# Patient Record
Sex: MALE | Race: BLACK OR AFRICAN AMERICAN | Employment: UNEMPLOYED | URBAN - METROPOLITAN AREA
[De-identification: names, ages, dates, MRNs, and addresses within clinical notes are randomized per-mention and may not be internally consistent; named-entity substitution may affect disease eponyms.]

---

## 2023-03-02 PROBLEM — F17.200 MODERATE TOBACCO USE DISORDER: Status: ACTIVE | Noted: 2023-03-02

## 2023-03-02 PROBLEM — L02.91 ABSCESS: Status: ACTIVE | Noted: 2023-03-02

## 2023-03-02 PROBLEM — R74.8 ELEVATED LIVER ENZYMES: Status: ACTIVE | Noted: 2023-03-02

## 2023-03-02 PROBLEM — F32.A DEPRESSION: Status: ACTIVE | Noted: 2023-03-02

## 2023-03-02 PROBLEM — A63.0 GENITAL WARTS: Status: ACTIVE | Noted: 2023-03-02

## 2023-03-02 PROBLEM — K04.7 DENTAL ABSCESS: Status: ACTIVE | Noted: 2023-03-02

## 2023-03-02 PROBLEM — R52 GENERALIZED PAIN: Status: ACTIVE | Noted: 2023-03-02

## 2023-03-02 PROBLEM — R10.9 FLANK PAIN, ACUTE: Status: ACTIVE | Noted: 2023-03-02

## 2023-03-02 PROBLEM — N05.1: Status: ACTIVE | Noted: 2023-03-02

## 2023-03-02 PROBLEM — F11.20: Status: ACTIVE | Noted: 2023-03-02

## 2023-03-02 PROBLEM — M54.9 CHRONIC BACK PAIN: Status: ACTIVE | Noted: 2023-03-02

## 2023-03-02 PROBLEM — F43.10 PTSD (POST-TRAUMATIC STRESS DISORDER): Status: ACTIVE | Noted: 2023-03-02

## 2023-03-02 PROBLEM — G47.00 INSOMNIA: Status: ACTIVE | Noted: 2023-03-02

## 2023-03-02 PROBLEM — D72.829 LEUKOCYTOSIS: Status: ACTIVE | Noted: 2023-03-02

## 2023-03-02 PROBLEM — R03.0 BLOOD PRESSURE ELEVATED WITHOUT HISTORY OF HTN: Status: ACTIVE | Noted: 2023-03-02

## 2023-03-02 PROBLEM — R36.1 HEMATOSPERMIA: Status: ACTIVE | Noted: 2023-03-02

## 2023-03-02 PROBLEM — N23 KIDNEY PAIN: Status: ACTIVE | Noted: 2023-03-02

## 2023-03-02 PROBLEM — L02.215 PERINEAL ABSCESS: Status: ACTIVE | Noted: 2023-03-02

## 2023-03-02 PROBLEM — G89.29 CHRONIC BACK PAIN: Status: ACTIVE | Noted: 2023-03-02

## 2023-03-02 PROBLEM — F07.81: Status: ACTIVE | Noted: 2023-03-02

## 2023-03-02 RX ORDER — QUETIAPINE FUMARATE 50 MG/1
1 TABLET, FILM COATED ORAL NIGHTLY
COMMUNITY
Start: 2020-01-23 | End: 2023-10-13 | Stop reason: SDUPTHER

## 2023-03-02 RX ORDER — BUPRENORPHINE AND NALOXONE 8; 2 MG/1; MG/1
FILM, SOLUBLE BUCCAL; SUBLINGUAL
COMMUNITY

## 2023-03-02 RX ORDER — MIRTAZAPINE 45 MG/1
1 TABLET, FILM COATED ORAL NIGHTLY
COMMUNITY
Start: 2020-01-23 | End: 2023-10-13 | Stop reason: SDUPTHER

## 2023-03-02 RX ORDER — HYDROXYZINE HYDROCHLORIDE 25 MG/1
1 TABLET, FILM COATED ORAL NIGHTLY
COMMUNITY
Start: 2020-01-23 | End: 2023-12-07 | Stop reason: SDUPTHER

## 2023-03-02 RX ORDER — BUSPIRONE HYDROCHLORIDE 30 MG/1
1 TABLET ORAL 3 TIMES DAILY
COMMUNITY
Start: 2020-01-23

## 2023-03-13 ENCOUNTER — HOSPITAL ENCOUNTER (INPATIENT)
Age: 43
LOS: 2 days | Discharge: PSYCHIATRIC OR SUBSTANCE USE FACILITY | End: 2023-03-16
Attending: EMERGENCY MEDICINE | Admitting: STUDENT IN AN ORGANIZED HEALTH CARE EDUCATION/TRAINING PROGRAM
Payer: COMMERCIAL

## 2023-03-13 DIAGNOSIS — S93.325A LISFRANC DISLOCATION, LEFT, INITIAL ENCOUNTER: ICD-10-CM

## 2023-03-13 DIAGNOSIS — F19.10 POLYSUBSTANCE ABUSE (HCC): Primary | ICD-10-CM

## 2023-03-13 DIAGNOSIS — S92.532A CLOSED DISPLACED FRACTURE OF DISTAL PHALANX OF LESSER TOE OF LEFT FOOT, INITIAL ENCOUNTER: ICD-10-CM

## 2023-03-13 DIAGNOSIS — S93.326A DISLOCATION OF TARSOMETATARSAL JOINT, UNSPECIFIED LATERALITY, INITIAL ENCOUNTER: ICD-10-CM

## 2023-03-13 PROCEDURE — 96372 THER/PROPH/DIAG INJ SC/IM: CPT

## 2023-03-13 PROCEDURE — 99285 EMERGENCY DEPT VISIT HI MDM: CPT

## 2023-03-13 ASSESSMENT — LIFESTYLE VARIABLES
HOW OFTEN DO YOU HAVE A DRINK CONTAINING ALCOHOL: NEVER
HOW MANY STANDARD DRINKS CONTAINING ALCOHOL DO YOU HAVE ON A TYPICAL DAY: PATIENT DOES NOT DRINK

## 2023-03-13 ASSESSMENT — PAIN - FUNCTIONAL ASSESSMENT: PAIN_FUNCTIONAL_ASSESSMENT: NONE - DENIES PAIN

## 2023-03-13 NOTE — LETTER
[place] Department Medicaid  CERTIFICATION OF NECESSITY  FOR NON-EMERGENCY TRANSPORTATION   BY GROUND AMBULANCE      Individual Information   1. Name: [facility] 2. [place] Medicaid Billing Number:    3. Address: [facility]      Transportation Provider Information   4. Provider Name:    5. [place] Provider Number:  National Provider Identifier (NPI):      Certification  7. Criteria:  During transport, this individual requires:  [x] Medical treatment or continuous     supervision by an EMT. [] The administration or regulation of oxygen by another person. [] Supervised protective restraint. 8. Period Beginning Date: 3/16/2023   9. Length  [x] Not more than 1 day(s)  [] One Year     Additional Information Relevant to Certification   10. Comments or Explanations, If Necessary or Appropriate   RETURNING TO [facility] Practitioner Information   11. Name of Practitioner: Phill QURESHI MD   12. [place] Medicaid Provider Number, If Applicable:  [address] Provider Identifier (NPI):      Signature Information   14. Date of Signature: 3/16/2023 15. Name of Person Signing: Mor Roque   16. Signature and Professional Designation: Electronically signed by RODO Leach on 3/16/2023 at 2:15 PM       [facility] [id number]  Rev. 7/2015     [address] Encounter Date/Time: 3/13/2023 [address] [place] [address] Account: [de-identified]    MRN: [id number]    Patient: [facility]    Contact Serial #: [id number]      ENCOUNTER          Patient Class: I Private Enc? No Unit RM BD: SEYZ 5WE [id number]/5415-B   Hospital Service:  INM   Encounter DX: Polysubstance abuse ([address]   ADM Provider: Jesus Palacios MD   Procedure:     ATT Provider: Kavya Villatoro MD   REF Provider:        Admission DX: Polysubstance abuse ([address] Dislocation of tarsometatarsal joint, unspecified laterality, initial encounter, Lisfranc dislocation, left, initial encounter, Closed displaced fracture of distal phalanx of lesser toe of left foot, initial encounter and DX codes: F19.10, S93.326A, S93.325A, S92.532A      PATIENT  Name: Grecia Gamez : 1980 (42 yrs)   Address: [facility] Sex: Male   City: [place] [address]         Marital Status: Single   Employer: NOT EMPLOYED         Mu-ism: None   Primary Care Provider:           Primary Phone: [address]   Contact Name Legal Guardian? Relationship to Patient Home Phone Work Phone   1. barron hollis  2. *No Contact Specified* No    Parent                      GUARANTOR            Guarantor: Grecia Gamez     : 1980   Address: [address] Sex: Male     [address]     Relation to Patient: Self       Home Phone: [phone]   Guarantor ID: [id number]       Work Phone:     Guarantor Employer: NOT EMPLOYED         Status: NOT EMPLO*      COVERAGE        PRIMARY INSURANCE   Payor: [facility]* Plan: Ellie Harding 34*   Payor Address: [place] [address]       Group Number:   Insurance Type: INDEMNITY   Subscriber Name: Evita Anderson : 1980   Subscriber ID: [id number] Pat. Rel. to Sub: Self   SECONDARY INSURANCE   Payor:   Plan:     Payor Address:  ,           Group Number:   Insurance Type:     Subscriber Name:   Subscriber :     Subscriber ID:   Pat.  Rel. to Sub:        CSN: [id number]

## 2023-03-14 ENCOUNTER — ANESTHESIA EVENT (OUTPATIENT)
Dept: OPERATING ROOM | Age: 43
End: 2023-03-14
Payer: COMMERCIAL

## 2023-03-14 ENCOUNTER — ANESTHESIA (OUTPATIENT)
Dept: OPERATING ROOM | Age: 43
End: 2023-03-14
Payer: COMMERCIAL

## 2023-03-14 ENCOUNTER — APPOINTMENT (OUTPATIENT)
Dept: CT IMAGING | Age: 43
End: 2023-03-14
Payer: COMMERCIAL

## 2023-03-14 ENCOUNTER — APPOINTMENT (OUTPATIENT)
Dept: GENERAL RADIOLOGY | Age: 43
End: 2023-03-14
Payer: COMMERCIAL

## 2023-03-14 PROBLEM — S93.325A LISFRANC DISLOCATION, LEFT, INITIAL ENCOUNTER: Status: ACTIVE | Noted: 2023-03-14

## 2023-03-14 LAB
ABO/RH: NORMAL
ACETAMINOPHEN LEVEL: <5 MCG/ML (ref 10–30)
AMPHETAMINE SCREEN, URINE: POSITIVE
ANION GAP SERPL CALCULATED.3IONS-SCNC: 14 MMOL/L (ref 7–16)
ANTIBODY IDENTIFICATION: NORMAL
ANTIBODY IDENTIFICATION: NORMAL
ANTIBODY SCREEN: NORMAL
BARBITURATE SCREEN URINE: NOT DETECTED
BASOPHILS ABSOLUTE: 0.03 E9/L (ref 0–0.2)
BASOPHILS RELATIVE PERCENT: 0.3 % (ref 0–2)
BENZODIAZEPINE SCREEN, URINE: NOT DETECTED
BUN BLDV-MCNC: 10 MG/DL (ref 6–20)
CALCIUM SERPL-MCNC: 9.8 MG/DL (ref 8.6–10.2)
CANNABINOID SCREEN URINE: POSITIVE
CHLORIDE BLD-SCNC: 98 MMOL/L (ref 98–107)
CO2: 27 MMOL/L (ref 22–29)
COCAINE METABOLITE SCREEN URINE: POSITIVE
CREAT SERPL-MCNC: 0.9 MG/DL (ref 0.7–1.2)
DR. NOTIFY: NORMAL
EKG ATRIAL RATE: 68 BPM
EKG P AXIS: 78 DEGREES
EKG P-R INTERVAL: 156 MS
EKG Q-T INTERVAL: 438 MS
EKG QRS DURATION: 82 MS
EKG QTC CALCULATION (BAZETT): 465 MS
EKG R AXIS: 23 DEGREES
EKG T AXIS: 65 DEGREES
EKG VENTRICULAR RATE: 68 BPM
EOSINOPHILS ABSOLUTE: 0.02 E9/L (ref 0.05–0.5)
EOSINOPHILS RELATIVE PERCENT: 0.2 % (ref 0–6)
ETHANOL: <10 MG/DL (ref 0–0.08)
FENTANYL SCREEN, URINE: POSITIVE
GFR SERPL CREATININE-BSD FRML MDRD: >60 ML/MIN/1.73
GLUCOSE BLD-MCNC: 98 MG/DL (ref 74–99)
HCT VFR BLD CALC: 45.7 % (ref 37–54)
HEMOGLOBIN: 15.4 G/DL (ref 12.5–16.5)
IMMATURE GRANULOCYTES #: 0.04 E9/L
IMMATURE GRANULOCYTES %: 0.4 % (ref 0–5)
INR BLD: 1.3
LYMPHOCYTES ABSOLUTE: 1.75 E9/L (ref 1.5–4)
LYMPHOCYTES RELATIVE PERCENT: 15.6 % (ref 20–42)
Lab: ABNORMAL
MCH RBC QN AUTO: 31.4 PG (ref 26–35)
MCHC RBC AUTO-ENTMCNC: 33.7 % (ref 32–34.5)
MCV RBC AUTO: 93.3 FL (ref 80–99.9)
METHADONE SCREEN, URINE: NOT DETECTED
MONOCYTES ABSOLUTE: 0.93 E9/L (ref 0.1–0.95)
MONOCYTES RELATIVE PERCENT: 8.3 % (ref 2–12)
NEUTROPHILS ABSOLUTE: 8.48 E9/L (ref 1.8–7.3)
NEUTROPHILS RELATIVE PERCENT: 75.2 % (ref 43–80)
OPIATE SCREEN URINE: NOT DETECTED
OXYCODONE URINE: NOT DETECTED
PDW BLD-RTO: 12.9 FL (ref 11.5–15)
PHENCYCLIDINE SCREEN URINE: POSITIVE
PLATELET # BLD: 270 E9/L (ref 130–450)
PMV BLD AUTO: 9.6 FL (ref 7–12)
POTASSIUM SERPL-SCNC: 3.4 MMOL/L (ref 3.5–5)
PROTHROMBIN TIME: 14.5 SEC (ref 9.3–12.4)
RBC # BLD: 4.9 E12/L (ref 3.8–5.8)
SALICYLATE, SERUM: <0.3 MG/DL (ref 0–30)
SODIUM BLD-SCNC: 139 MMOL/L (ref 132–146)
TRICYCLIC ANTIDEPRESSANTS SCREEN SERUM: NEGATIVE NG/ML
WBC # BLD: 11.3 E9/L (ref 4.5–11.5)

## 2023-03-14 PROCEDURE — 86900 BLOOD TYPING SEROLOGIC ABO: CPT

## 2023-03-14 PROCEDURE — 73630 X-RAY EXAM OF FOOT: CPT

## 2023-03-14 PROCEDURE — 86922 COMPATIBILITY TEST ANTIGLOB: CPT

## 2023-03-14 PROCEDURE — 86850 RBC ANTIBODY SCREEN: CPT

## 2023-03-14 PROCEDURE — 73700 CT LOWER EXTREMITY W/O DYE: CPT

## 2023-03-14 PROCEDURE — 86901 BLOOD TYPING SEROLOGIC RH(D): CPT

## 2023-03-14 PROCEDURE — 80179 DRUG ASSAY SALICYLATE: CPT

## 2023-03-14 PROCEDURE — 93005 ELECTROCARDIOGRAM TRACING: CPT | Performed by: ORTHOPAEDIC SURGERY

## 2023-03-14 PROCEDURE — 85025 COMPLETE CBC W/AUTO DIFF WBC: CPT

## 2023-03-14 PROCEDURE — 80307 DRUG TEST PRSMV CHEM ANLYZR: CPT

## 2023-03-14 PROCEDURE — 86870 RBC ANTIBODY IDENTIFICATION: CPT

## 2023-03-14 PROCEDURE — 36415 COLL VENOUS BLD VENIPUNCTURE: CPT

## 2023-03-14 PROCEDURE — 1200000000 HC SEMI PRIVATE

## 2023-03-14 PROCEDURE — 80048 BASIC METABOLIC PNL TOTAL CA: CPT

## 2023-03-14 PROCEDURE — 6360000002 HC RX W HCPCS: Performed by: INTERNAL MEDICINE

## 2023-03-14 PROCEDURE — 85610 PROTHROMBIN TIME: CPT

## 2023-03-14 PROCEDURE — 6360000002 HC RX W HCPCS: Performed by: EMERGENCY MEDICINE

## 2023-03-14 PROCEDURE — 93010 ELECTROCARDIOGRAM REPORT: CPT | Performed by: INTERNAL MEDICINE

## 2023-03-14 PROCEDURE — 82077 ASSAY SPEC XCP UR&BREATH IA: CPT

## 2023-03-14 PROCEDURE — 86880 COOMBS TEST DIRECT: CPT

## 2023-03-14 PROCEDURE — 6370000000 HC RX 637 (ALT 250 FOR IP): Performed by: INTERNAL MEDICINE

## 2023-03-14 PROCEDURE — 80143 DRUG ASSAY ACETAMINOPHEN: CPT

## 2023-03-14 RX ORDER — PREDNISONE 10 MG/1
10 TABLET ORAL DAILY
Status: ON HOLD | COMMUNITY
End: 2023-03-16 | Stop reason: HOSPADM

## 2023-03-14 RX ORDER — QUETIAPINE FUMARATE 25 MG/1
50 TABLET, FILM COATED ORAL DAILY
Status: DISCONTINUED | OUTPATIENT
Start: 2023-03-14 | End: 2023-03-16 | Stop reason: HOSPADM

## 2023-03-14 RX ORDER — CIPROFLOXACIN 500 MG/1
500 TABLET, FILM COATED ORAL EVERY 12 HOURS SCHEDULED
Status: ON HOLD | COMMUNITY
End: 2023-03-16 | Stop reason: HOSPADM

## 2023-03-14 RX ORDER — SODIUM BICARBONATE 325 MG/1
650 TABLET ORAL 2 TIMES DAILY
Status: ON HOLD | COMMUNITY
End: 2023-03-16 | Stop reason: HOSPADM

## 2023-03-14 RX ORDER — OXYCODONE HYDROCHLORIDE 10 MG/1
10 TABLET ORAL EVERY 6 HOURS PRN
Status: DISCONTINUED | OUTPATIENT
Start: 2023-03-14 | End: 2023-03-16 | Stop reason: HOSPADM

## 2023-03-14 RX ORDER — HYDROXYZINE PAMOATE 25 MG/1
25 CAPSULE ORAL NIGHTLY
Status: DISCONTINUED | OUTPATIENT
Start: 2023-03-14 | End: 2023-03-16 | Stop reason: HOSPADM

## 2023-03-14 RX ORDER — CHLORHEXIDINE GLUCONATE 0.12 MG/ML
15 RINSE ORAL 2 TIMES DAILY
Status: ON HOLD | COMMUNITY
End: 2023-03-16 | Stop reason: HOSPADM

## 2023-03-14 RX ORDER — HYDROXYZINE HYDROCHLORIDE 10 MG/1
25 TABLET, FILM COATED ORAL NIGHTLY
COMMUNITY

## 2023-03-14 RX ORDER — QUETIAPINE FUMARATE 25 MG/1
50 TABLET, FILM COATED ORAL DAILY
Status: ON HOLD | COMMUNITY
End: 2023-03-16 | Stop reason: HOSPADM

## 2023-03-14 RX ORDER — BUPRENORPHINE HYDROCHLORIDE AND NALOXONE HYDROCHLORIDE DIHYDRATE 8; 2 MG/1; MG/1
2 TABLET SUBLINGUAL DAILY
Status: DISCONTINUED | OUTPATIENT
Start: 2023-03-14 | End: 2023-03-16 | Stop reason: HOSPADM

## 2023-03-14 RX ORDER — HALOPERIDOL 5 MG/ML
2 INJECTION INTRAMUSCULAR ONCE
Status: COMPLETED | OUTPATIENT
Start: 2023-03-14 | End: 2023-03-14

## 2023-03-14 RX ORDER — CLINDAMYCIN HYDROCHLORIDE 150 MG/1
150 CAPSULE ORAL 3 TIMES DAILY
Status: ON HOLD | COMMUNITY
End: 2023-03-16 | Stop reason: HOSPADM

## 2023-03-14 RX ORDER — LEVOFLOXACIN 750 MG/1
750 TABLET ORAL
Status: ON HOLD | COMMUNITY
End: 2023-03-16 | Stop reason: HOSPADM

## 2023-03-14 RX ORDER — BUPRENORPHINE HYDROCHLORIDE AND NALOXONE HYDROCHLORIDE DIHYDRATE 8; 2 MG/1; MG/1
2 TABLET SUBLINGUAL DAILY
COMMUNITY

## 2023-03-14 RX ORDER — MIRTAZAPINE 30 MG/1
45 TABLET, FILM COATED ORAL NIGHTLY
COMMUNITY

## 2023-03-14 RX ORDER — IBUPROFEN 400 MG/1
400 TABLET ORAL ONCE
Status: DISCONTINUED | OUTPATIENT
Start: 2023-03-14 | End: 2023-03-16 | Stop reason: HOSPADM

## 2023-03-14 RX ORDER — OXYCODONE HYDROCHLORIDE 5 MG/1
10 CAPSULE ORAL EVERY 6 HOURS PRN
Status: ON HOLD | COMMUNITY
End: 2023-03-15 | Stop reason: HOSPADM

## 2023-03-14 RX ORDER — MIRTAZAPINE 15 MG/1
45 TABLET, FILM COATED ORAL NIGHTLY
Status: DISCONTINUED | OUTPATIENT
Start: 2023-03-14 | End: 2023-03-16 | Stop reason: HOSPADM

## 2023-03-14 RX ADMIN — QUETIAPINE FUMARATE 50 MG: 25 TABLET ORAL at 21:58

## 2023-03-14 RX ADMIN — HALOPERIDOL LACTATE 2 MG: 5 INJECTION, SOLUTION INTRAMUSCULAR at 06:48

## 2023-03-14 RX ADMIN — MIRTAZAPINE 45 MG: 15 TABLET, FILM COATED ORAL at 22:43

## 2023-03-14 RX ADMIN — OXYCODONE HYDROCHLORIDE 10 MG: 10 TABLET ORAL at 22:42

## 2023-03-14 RX ADMIN — BUPRENORPHINE HYDROCHLORIDE AND NALOXONE HYDROCHLORIDE DIHYDRATE 2 TABLET: 8; 2 TABLET SUBLINGUAL at 21:59

## 2023-03-14 RX ADMIN — HYDROXYZINE PAMOATE 25 MG: 25 CAPSULE ORAL at 21:58

## 2023-03-14 ASSESSMENT — PAIN SCALES - GENERAL: PAINLEVEL_OUTOF10: 6

## 2023-03-14 ASSESSMENT — PAIN DESCRIPTION - LOCATION: LOCATION: ANKLE;FOOT

## 2023-03-14 NOTE — ED NOTES
Patient continues to rest in bed with no signs of distress. Respirations even and unlabored at this time.       Jada Street RN  03/14/23 9942

## 2023-03-14 NOTE — ED PROVIDER NOTES
[address]        Pt Name: Smith Rose  MRN: [id number]  [place] 1980  Date of evaluation: 3/13/2023  Provider: Thong Gutierrez DO  PCP: No primary care provider on file. Note Started: 11:20 PM EDT 3/13/23    CHIEF COMPLAINT       Chief Complaint   Patient presents with    Drug Overdose     Pt from [facility] found in seclusion room with plastic bag with white substance on arrival pt alert, speech slurred        HISTORY OF PRESENT ILLNESS: 1 or more Elements   History From: Patient    Limitations to history : None    Smith Rose is a 38 y.o. male who presents to the emergency department for possible substance use. Upon arrival to the ED the patient states that he may have used drugs earlier in the day but did not use anything recently. He states that he is currently asymptomatic. No chest pain or shortness of breath. No nausea vomiting diarrhea. No numbness tingling. No focal deficits. Nursing Notes were all reviewed and agreed with or any disagreements were addressed in the HPI. REVIEW OF EXTERNAL NOTE :       EMS report taken from ambulance. [facility] is [facility]. Patient found with white substance concern for possible drug ingestion. Therefore patient sent to our facility. Patient's vital signs were within normal limits in route. REVIEW OF SYSTEMS :           Positives and Pertinent negatives as per HPI. SURGICAL HISTORY   History reviewed. No pertinent surgical history. CURRENTMEDICATIONS       Previous Medications    No medications on file       ALLERGIES     Patient has no known allergies. FAMILYHISTORY     History reviewed. No pertinent family history.      SOCIAL HISTORY       Social History     Tobacco Use    Smoking status: Unknown   Substance Use Topics    Alcohol use: Not Currently    Drug use: Not Currently       SCREENINGS        [place] Coma Scale  Eye Opening: Spontaneous  Best Verbal Response: Oriented  Best Motor Response: Obeys commands  [place] Coma Scale Score: 15                CIWA Assessment  BP: (!) 147/68  Heart Rate: 71           PHYSICAL EXAM  1 or more Elements     ED Triage Vitals   BP Temp Temp src Pulse Resp SpO2 Height Weight   -- -- -- -- -- -- -- --              Constitutional/General: Alert and oriented x3  Head: Normocephalic and atraumatic  Eyes: PERRL, EOMI, conjunctiva normal, sclera non icteric  ENT:  Oropharynx clear, handling secretions, no trismus, no asymmetry of the posterior oropharynx or uvular edema  Neck: Supple, full ROM, no stridor, no meningeal signs  Respiratory: Lungs clear to auscultation bilaterally, no wheezes, rales, or rhonchi. Not in respiratory distress  Cardiovascular:  Regular rate. Regular rhythm. No murmurs, no gallops, no rubs. 2+ distal pulses. Equal extremity pulses. Chest: No chest wall tenderness  GI:  Abdomen Soft, Non tender, Non distended. No rebound, guarding, or rigidity. No pulsatile masses. Musculoskeletal: Moves all extremities x 4. Warm and well perfused, no clubbing, no cyanosis, no edema. Capillary refill <3 seconds, swelling of the left foot  Integument: skin warm and dry. No rashes. Neurologic: GCS 15, no focal deficits, symmetric strength 5/5 in the upper and lower extremities bilaterally  Psychiatric: Normal Affect            DIAGNOSTIC RESULTS   LABS:    Labs Reviewed   CBC WITH AUTO DIFFERENTIAL   BASIC METABOLIC PANEL   PROTIME-INR   TYPE AND SCREEN       As interpreted by me, the above displayed labs are abnormal. All other labs obtained during this visit were within normal range or not returned as of this dictation.       EKG Interpretation  Interpreted by emergency department physician, Dago Marshall DO              RADIOLOGY:   Non-plain film images such as CT, Ultrasound and MRI are read by the radiologist. Plain radiographic images are visualized and preliminarily interpreted by the ED Provider with the below findings:    X-ray concerning for Lisfranc injury of the left foot    Interpretation per the Radiologist below, if available at the time of this note:    XR FOOT LEFT (MIN 3 VIEWS)   Final Result   Moderately displaced midfoot fracture dislocation of the 2nd through 5th   digits with mild dorsal lateral subluxation and complete rupture of the   Lisfranc ligament. RECOMMENDATION:   Careful clinical correlation and follow up recommended. No results found. No results found. PROCEDURES   Unless otherwise noted below, none          CRITICAL CARE TIME (.cct)       PAST MEDICAL HISTORY/Chronic Conditions Affecting Care      has no past medical history on file. EMERGENCY DEPARTMENT COURSE    Vitals:    Vitals:    03/13/23 2325 03/14/23 0451   BP: (!) 167/105 (!) 147/68   Pulse: 66 71   Resp: 18 19   Temp: 97.3 °F (36.3 °C)    SpO2: 93% 97%   Weight: 150 lb (68 kg)    Height: 5' 11\" (1.803 m)        Patient was given the following medications:  Medications   ibuprofen (ADVIL;MOTRIN) tablet 400 mg (400 mg Oral Not Given 3/14/23 0880)   ceFAZolin (ANCEF) 2,000 mg in sterile water 20 mL IV syringe (has no administration in time range)   haloperidol lactate (HALDOL) injection 2 mg (2 mg IntraMUSCular Given 3/14/23 5502)           Is this patient to be included in the SEP-1 Core Measure due to severe sepsis or septic shock? No Exclusion criteria - the patient is NOT to be included for SEP-1 Core Measure due to: Infection is not suspected        Medical Decision Making/Differential Diagnosis:    CC/HPI Summary, Social Determinants of health, Records Reviewed, DDx, testing done/not done, ED Course, Reassessment, disposition considerations/shared decision making with patient, consults, disposition:            Medical Decision Making  Amount and/or Complexity of Data Reviewed  Labs: ordered. Radiology: ordered.   ECG/medicine tests: ordered. Risk  Prescription drug management. Decision regarding hospitalization. This is a 63-year-old male who presented to the ED for possible drug ingestion. Upon arrival to the ED patient's vital signs are within normal limits. Patient is awake and alert. GCS 15. Neurologically intact. Patient has dilated pupils. No pinpoint pupils. Differential diagnosis includes but is not limited to polysubstance abuse, opiate use, benzo use, electrolyte normality. Patient undergo laboratory work-up with a CBC CMP urine drug screen serum drug screen EKG. patient was alert and oriented and refused initial blood work. Patient was going to be discharged back to [facility] for completion of his psychiatric care but did not come planed of foot pain. Patient had some obvious swelling to his left foot. Patient went x-ray which showed a severe Lisfranc injury. Orthopedic with surgery was consulted. They evaluated the patient and splint the patient. They state that patient will need admitted for orthopedic fixation and pins. Therefore case was discussed with sound who has agreed admit the patient for further care with psych consult and orthopedic consult for completion of patient's care. CONSULTS: (Who and What was discussed)  IP CONSULT TO ORTHOPEDIC SURGERY  IP CONSULT TO INTERNAL MEDICINE        I am the Primary Clinician of Record. FINAL IMPRESSION      1. Polysubstance abuse ([facility]    2. Dislocation of tarsometatarsal joint, unspecified laterality, initial encounter          DISPOSITION/PLAN     DISPOSITION Admitted 03/14/2023 07:56:24 AM      PATIENT REFERRED TO:  No follow-up provider specified.     DISCHARGE MEDICATIONS:  New Prescriptions    No medications on file       DISCONTINUED MEDICATIONS:  Discontinued Medications    No medications on file              (Please note that portions of this note were completed with a voice recognition program.  Efforts were made to edit the dictations but occasionally words are mis-transcribed.)    Anjali Baker DO (electronically signed)            Anjali Baker DO  03/14/23 4918

## 2023-03-14 NOTE — ED NOTES
Received call from blood bank who reported that patient has antibodies and is questioning additional information about possible previous blood transfusions. Explained patient is psychotic and medicated at this time. Explained per encounter record patient was at [facility] clinic for trauma in the past.  Blood bank to reach out to [facility] clinic to check on previous blood transfusions.       Rena Rodriguez RN  03/14/23 0359

## 2023-03-14 NOTE — ED NOTES
Patient still refuses all labs, urine and ekg. Patient attempting to get out of bed. Provider aware.  PAS set up to arrange transport to [facility] ( facility patient came from)     Tho Adams RN  03/14/23 2742

## 2023-03-14 NOTE — ED NOTES
Patient refusing labs, ekg and urine. Dr. Martina Ashford aware. Patient resting in bed at this time with no distress. Respirations even and non labored. Patient states \"I want to go home\", patient aware that he will be returning to [facility]. Patient resting at this time, no call bell due to zavala bed.       Luis Russell RN  03/13/23 2127

## 2023-03-14 NOTE — CONSULTS
Department of Orthopedic Surgery  Attending Consult Note          Reason for Consult:  Left foot pain    HISTORY OF PRESENT ILLNESS:       Patient is a 38 y.o. male who presents with chief complaint of left foot pain. The patient is notedly pink slipped to [facility] facility for [facility]anoia. Apparently has had multiple attempts to elope from the facility he is currently here in the emergency department for concerns of substance abuse. He complains of pain in his left foot however per reports he has been standing and ambulating on the foot with a limp. Patient is currently altered due to substance abuse and does not remember the injury or event to his left foot. Patient does not contribute to history otherwise. Denies any other orthopedic complaints at this time. Past Medical History:    History reviewed. No pertinent past medical history. Past Surgical History:    History reviewed. No pertinent surgical history. Current Medications:   Current Facility-Administered Medications: ibuprofen (ADVIL;MOTRIN) tablet 400 mg, 400 mg, Oral, Once  Allergies:  Patient has no known allergies. Social History:   TOBACCO:   has no history on file for tobacco use. ETOH:   reports that he does not currently use alcohol. DRUGS:   reports that he does not currently use drugs. ACTIVITIES OF DAILY LIVING:    OCCUPATION:    Family History:   History reviewed. No pertinent family history. REVIEW OF SYSTEMS:  Left foot pain otherwise unable to obtain due to patient's lack of cooperation with history and exam.    PHYSICAL EXAM:    VITALS:  BP (!) 147/68   Pulse 71   Temp 97.3 °F (36.3 °C)   Resp 19   Ht 5' 11\" (1.803 m)   Wt 150 lb (68 kg)   SpO2 97%   BMI 20.92 kg/m²   CONSTITUTIONAL:  awake, alert, cooperative, no apparent distress, and appears stated age  MUSCULOSKELETAL:  Left lower Extremity:  Intact circumferentially. There is a moderate amount of swelling to the left foot noted.   Patient has global tenderness about the left foot. Compartments soft and compressible, calf non-tender  +DP & PT pulses, Brisk Cap refill, Toes warm and perfused  Sensation grossly intact superficial/deep peroneal,saphenous,sural,tibial n. distributions  +GS/TA/EHL. Nontender at the leg knee thigh or no pain with logroll of the hip. Secondary Exam:   bilateralUE: No obvious signs of trauma. -TTP to fingers, hand, wrist, forearm, elbow, humerus, shoulder or clavicle. rightLE: No obvious signs of trauma. -TTP to foot, ankle, leg, knee, thigh, hip    Pelvis: -TTP, -Log roll, -Heel strike     DATA:    CBC: No results found for: WBC, RBC, HGB, HCT, MCV, MCH, MCHC, RDW, PLT, MPV  PT/INR:  No results found for: PROTIME, INR  CRP/ESR: No results found for: CRP, SEDRATE  Lactic Acid : No results found for: LACTA    Radiology Review:  03/14/23 - XR of the left foot reviewed demonstrate a left foot Lisfranc fracture dislocation with lateral displacement of the second through fifth metatarsals and some lateral subluxation of the first metatarsal on the medial cuneiform. Significant widening at the Lisfranc joint. IMPRESSION:   Left foot Lisfranc fracture dislocation    PLAN:  NWB - LLE  Patient was placed into a well padded 3 sided bulky Mayen splint. Pain Control multimodal  PT/OT   Continue ice and elevation to decrease swelling  Patient will require surgical intervention for his Lisfranc injury. Pain on the swelling is to entail either percutaneous fixation or open reduction internal fixation.   The plan for this today with Dr. Ca Rai  N.p.o. now  Pre-Op Labs and imaging  Discussed with Dr. Ca Rai

## 2023-03-14 NOTE — ED NOTES
Report given PAS, xray called to bedside to preform xray  Provider aware and states he will look at images prior to discharge      Marilia Bustillo RN  03/14/23 6926

## 2023-03-14 NOTE — ED NOTES
Patient was blocking [facility] door due to paranoia. [facility] came to [facility] to help de-escalate patient and assist back to patients room.      AMI Mendoza, [place]  03/14/23 0101

## 2023-03-14 NOTE — CARE COORDINATION
3/14/2023 social work transition of care planning  Pt is from [address] [facility] will be to return(if appropriate). Sw will need to fax clinicals to include therapy notes to  . Pt for sx today. Sw will follow.   Electronically signed by RODO Ivy on 3/14/2023 at 2:39 PM

## 2023-03-14 NOTE — ED NOTES
Patient resting in bed with eyes closed. NPO awaiting inpatient bed assignment.      Cr David RN  03/14/23 4563

## 2023-03-14 NOTE — ED NOTES
Patient remains in zavala bed with no call bed available. Patients respirations are even and unlabored.  Patient denies any needs at this time      Sakshi Delaney RN  03/14/23 0134

## 2023-03-14 NOTE — ED NOTES
Patient brought back to [facility], attempted to elope at the door. [address] called for assist. Patient was limping and assisted to [facility]. FITO Aranda informed that patient is returning to [facility], patient was here for a medical issue and keeps trying to elope and was brought back to [facility] 29 to sit until transport arrives ETA 0530. Rosi RN said she would bring back patients paperwork so transport packet can be made for patient to return to [facility].      MAI Sullivan, [facility]  03/14/23 6457

## 2023-03-14 NOTE — H&P
Hospital Medicine   History & Physical        Chief complaint: Foot pain    Date of Service: Pt seen/examined in consultation on 3/14/2023    History Of Present Illness:    Mr. Shari Mercedes, a 38y.o. year old male  who  has no past medical history on file. Patient was admitted to the hospital after he complained of left foot pain. He is a [facility] and pink slipped with paranoia. He has complained of left foot pain and limp. He was evaluated by Ortho. Currently he is sleepy and does not appear to be in distress. He has been having episodes of paranoia and attempted to elope from the facility. Past Medical History:    History reviewed. No pertinent past medical history. Paranoia  Past Surgical History:    History reviewed. No pertinent surgical history. Medications Prior to Admission:    Prior to Admission medications    Not on File       Allergies:  Patient has no known allergies. Social History:      TOBACCO:   has no history on file for tobacco use. ETOH:   reports that he does not currently use alcohol. Family History:      Reviewed in detail and negative for DM, CAD, Cancer, CVA. Positive as follows:    History reviewed. No pertinent family history. REVIEW OF SYSTEMS:   Pertinent positives as noted in the HPI. All other systems reviewed and negative. PHYSICAL EXAM:  BP (!) 147/68   Pulse 71   Temp 97.3 °F (36.3 °C)   Resp 19   Ht 5' 11\" (1.803 m)   Wt 150 lb (68 kg)   SpO2 97%   BMI 20.92 kg/m²   General appearance: No apparent distress, appears stated age and cooperative. Sleepy   HEENT: Normal cephalic, atraumatic without obvious deformity. Pupils equal, round, and reactive to light. Extra ocular muscles intact. Conjunctivae/corneas clear. Neck: Supple, with full range of motion. No jugular venous distention. Trachea midline. Respiratory:  Normal respiratory effort.  Clear to auscultation, bilaterally without crackles or rhonchi  Cardiovascular: s1s2 n  rate, normal rhythm with normal S1/S2 , with no murmurs  Abdomen: Soft, non-tender, non-distended with normal bowel sounds. Musculoskeletal:  No clubbing, cyanosis or edema bilaterally. Skin: Skin color, texture, turgor normal.  No rashes or lesions. Neurologic:  Neurovascularly intact without any focal sensory/motor deficits. Cranial nerves: II-XII intact, grossly non-focal.  Psychiatric: Sleepy     Labs:     CBC:   Recent Labs     03/14/23  0732   WBC 11.3   RBC 4.90   HGB 15.4   HCT 45.7   MCV 93.3   RDW 12.9        BMP:   Recent Labs     03/14/23  0732      K 3.4*   CL 98   CO2 27   BUN 10   CREATININE 0.9     LFT:  No results for input(s): PROT, ALB, ALKPHOS, ALT, AST, BILITOT, AMYLASE, LIPASE in the last 72 hours. CE:  No results for input(s): Jamia Nasuti in the last 72 hours. PT/INR:   Recent Labs     03/14/23  0732   INR 1.3     BNP: No results for input(s): BNP in the last 72 hours. Hgb A1C: No results found for: LABA1C  No results found for: EAG  ESR: No results found for: SEDRATE  CRP: No results found for: CRP  D Dimer: No results found for: DDIMER  Folate and B12: No results found for: [id number], No results found for: FOLATE  Lactic Acid: No results found for: LACTA  Thyroid Studies: No results found for: TSH, C2FFHSQ, A7NFBXS, THYROIDAB      ASSESSMENT: PLAN:    Left foot Lisfranc fracture dislocation: Ortho following, splint in place. Continue supportive care, surgery per Ortho. Paranoia: Psychiatry consultation. Mild hypokalemia: Potassium 3.4, replace as indicated      Diet: Diet NPO      +++++++++++++++++++++++++++++++++++++++++++++++++  Jarvis Arana MD  [address]/ [address], [address]  +++++++++++++++++++++++++++++++++++++++++++++++++  NOTE: This report was transcribed using voice recognition software.  Every effort was made to ensure accuracy; however, inadvertent computerized transcription errors may be present.

## 2023-03-14 NOTE — ED NOTES
Pt is disorganized, disoriented, pacing around room, paranoid about any noise he hears, watchful.      Braxton Osorio RN  03/14/23 1855

## 2023-03-14 NOTE — ED NOTES
Nurse to nurse report given to Cecilia PAYTON on 5WE ortho unit. Staff office called and they are aware of need for sitter.       Aniya Gloria RN  03/14/23 [address] Lemuel Gonzalez RN  03/14/23 8313

## 2023-03-14 NOTE — ED NOTES
Yrn tried to deescalate patient and distract him by talking to patient and encouraging him to sit so he can rest his broken foot, He had tried to take the mattress of the bed and spilled all his water tech cleaned up his mess, and for an hour encouraged him to rest foot and sit down told him several times he is safe and in a safe place and if he wanted to talk he could talk to tech or nurse.       Ember Markham  03/14/23 8827

## 2023-03-14 NOTE — ED NOTES
Pt will be a medical admission. I called [facility] and spoke to [facility] and requested the pink slip be faxed to the [facility] (received and in pt's soft chart)    Pt will need to return back to [facility] once medically stable ([phone])    However, [facility] said that pt needs to be back in their building within 72 hours of leaving out of their facility, or pt cannot return back. I contacted and updated my supervisor, Derrick Baires.       Lulu Solomon, [facility]  03/14/23 207 [facility], [facility]  03/14/23 0080

## 2023-03-14 NOTE — ED NOTES
Ortho in to place splint on left lower extremity. Patient was told by ortho that he will need to be admitted to medical to undergo surgical procedure to repair the fracture. Patient nodded when he this was explained to him.       Mario Jackson RN  03/14/23 1767

## 2023-03-14 NOTE — ED NOTES
Patient attempting to elope from ED after being educated by the nurse multiple times that he is being transported back to [facility]. Patient states that he believes his left foot is broken but doesn't know how he broke it. Nurse to Nurse to [facility] called to Annabelle. Nurse stated that patient was in seclusion for attempting to elope multiple times. Patient is pink slipped to [facility] for paranoia. Patient showing signs of paranoia and elopement risk. Charge nurse aware and patient moved to the [facility]. Upon transfer patient attempted to jump out of bed. No injury or harm to patient. Police and staff assisted patient to safe room. Report given to social work nurse Emelina on break. Patients ETA for PAS is 0530.       Sakshi Delaney RN  03/14/23 5237

## 2023-03-14 NOTE — ED NOTES
Patient remains in bed with eyes closed, respirations even and non labored. No distress noted at this time.       Kirby Vidal RN  03/14/23 0884

## 2023-03-14 NOTE — ED NOTES
Pt attempted multiple times to give urine sample. Unsuccessful at this time.       Sania Mood  03/14/23 4190

## 2023-03-14 NOTE — DISCHARGE INSTRUCTIONS
[facility] Department of Orthopedic Surgery  [address] MD Dr. Ronaldo Dustman, MD Suellen Abate, PA-C Adelene Look PA-C Floy Honey PA-C      Orthopaedics Discharge Instructions   Weight bearing Status - Non-weight bearing - on left lower Extremity  Pain medication Per Prescriptions  Contact Office for Medication Refill- [phone]  Office can refill pain med every 7 days  If patient discharging to facility then pain control will be continued per facility physician  Ice to operative/injured site for 15-30 minutes of each hour for next 5 days    Recommend that you continue to ice the area 2-3 times per day after this   Elevate operative/injured limb on 2 pillows at home  Goal is to have limb above the heart if able  Continue DVT Prophylaxis (blood clot prevention) as Prescribed: Aspirin 325 twice daily   Wound care - Keep splint clean, dry and intact until follow up. Fracture Care -  Remain strict non-weight bearing. Follow Up in Office in 2 weeks. Your first post op appointment is often with one of our PAs. Call the office at [phone] or directions or with any questions. Watch for these significant complications. Call physician if they or any other problems occur:  Fever over 101°, redness, swelling or warmth at the operative site  Unrelieved nausea    Foul smelling or cloudy drainage at the operative site   Unrelieved pain    Blood soaked dressing. (Some oozing may be normal)     Numb, pale, blue, cold or tingling extremity    No future appointments. It is the Department of Orthopaedic Trauma's standard of practice that providers will de-escalate(wean) all patients from narcotic(opioid) medications during the post-operative period. We provide multimodal pain control but opioid medications are tapered in all of our patients.   If patient requires referral to pain management for prolonged taper off of opioid pain medication we will facilitate this process.

## 2023-03-15 ENCOUNTER — APPOINTMENT (OUTPATIENT)
Dept: GENERAL RADIOLOGY | Age: 43
End: 2023-03-15
Payer: COMMERCIAL

## 2023-03-15 PROBLEM — S92.532A CLOSED DISPLACED FRACTURE OF DISTAL PHALANX OF LESSER TOE OF LEFT FOOT, INITIAL ENCOUNTER: Status: ACTIVE | Noted: 2023-03-15

## 2023-03-15 LAB
ANION GAP SERPL CALCULATED.3IONS-SCNC: 10 MMOL/L (ref 7–16)
BUN SERPL-MCNC: 7 MG/DL (ref 6–20)
CALCIUM SERPL-MCNC: 9.2 MG/DL (ref 8.6–10.2)
CHLORIDE SERPL-SCNC: 99 MMOL/L (ref 98–107)
CO2 SERPL-SCNC: 30 MMOL/L (ref 22–29)
CREAT SERPL-MCNC: 0.9 MG/DL (ref 0.7–1.2)
GLUCOSE SERPL-MCNC: 100 MG/DL (ref 74–99)
POTASSIUM SERPL-SCNC: 3.1 MMOL/L (ref 3.5–5)
SODIUM SERPL-SCNC: 139 MMOL/L (ref 132–146)

## 2023-03-15 PROCEDURE — 3209999900 FLUORO FOR SURGICAL PROCEDURES

## 2023-03-15 PROCEDURE — 73630 X-RAY EXAM OF FOOT: CPT

## 2023-03-15 PROCEDURE — 97161 PT EVAL LOW COMPLEX 20 MIN: CPT

## 2023-03-15 PROCEDURE — 97535 SELF CARE MNGMENT TRAINING: CPT

## 2023-03-15 PROCEDURE — 3600000015 HC SURGERY LEVEL 5 ADDTL 15MIN: Performed by: ORTHOPAEDIC SURGERY

## 2023-03-15 PROCEDURE — 2500000003 HC RX 250 WO HCPCS: Performed by: ORTHOPAEDIC SURGERY

## 2023-03-15 PROCEDURE — 6370000000 HC RX 637 (ALT 250 FOR IP): Performed by: STUDENT IN AN ORGANIZED HEALTH CARE EDUCATION/TRAINING PROGRAM

## 2023-03-15 PROCEDURE — 3600000005 HC SURGERY LEVEL 5 BASE: Performed by: ORTHOPAEDIC SURGERY

## 2023-03-15 PROCEDURE — 2580000003 HC RX 258

## 2023-03-15 PROCEDURE — 6360000002 HC RX W HCPCS: Performed by: INTERNAL MEDICINE

## 2023-03-15 PROCEDURE — 36415 COLL VENOUS BLD VENIPUNCTURE: CPT

## 2023-03-15 PROCEDURE — 97165 OT EVAL LOW COMPLEX 30 MIN: CPT

## 2023-03-15 PROCEDURE — 2709999900 HC NON-CHARGEABLE SUPPLY: Performed by: ORTHOPAEDIC SURGERY

## 2023-03-15 PROCEDURE — 6360000002 HC RX W HCPCS

## 2023-03-15 PROCEDURE — 7100000000 HC PACU RECOVERY - FIRST 15 MIN: Performed by: ORTHOPAEDIC SURGERY

## 2023-03-15 PROCEDURE — 3700000000 HC ANESTHESIA ATTENDED CARE: Performed by: ORTHOPAEDIC SURGERY

## 2023-03-15 PROCEDURE — 2720000010 HC SURG SUPPLY STERILE: Performed by: ORTHOPAEDIC SURGERY

## 2023-03-15 PROCEDURE — C1713 ANCHOR/SCREW BN/BN,TIS/BN: HCPCS | Performed by: ORTHOPAEDIC SURGERY

## 2023-03-15 PROCEDURE — 2580000003 HC RX 258: Performed by: STUDENT IN AN ORGANIZED HEALTH CARE EDUCATION/TRAINING PROGRAM

## 2023-03-15 PROCEDURE — 28615 REPAIR FOOT DISLOCATION: CPT | Performed by: ORTHOPAEDIC SURGERY

## 2023-03-15 PROCEDURE — 97530 THERAPEUTIC ACTIVITIES: CPT

## 2023-03-15 PROCEDURE — 6370000000 HC RX 637 (ALT 250 FOR IP): Performed by: INTERNAL MEDICINE

## 2023-03-15 PROCEDURE — C1769 GUIDE WIRE: HCPCS | Performed by: ORTHOPAEDIC SURGERY

## 2023-03-15 PROCEDURE — 7100000001 HC PACU RECOVERY - ADDTL 15 MIN: Performed by: ORTHOPAEDIC SURGERY

## 2023-03-15 PROCEDURE — 3700000001 HC ADD 15 MINUTES (ANESTHESIA): Performed by: ORTHOPAEDIC SURGERY

## 2023-03-15 PROCEDURE — 80048 BASIC METABOLIC PNL TOTAL CA: CPT

## 2023-03-15 PROCEDURE — 6360000002 HC RX W HCPCS: Performed by: STUDENT IN AN ORGANIZED HEALTH CARE EDUCATION/TRAINING PROGRAM

## 2023-03-15 PROCEDURE — 0SSL04Z REPOSITION LEFT TARSOMETATARSAL JOINT WITH INTERNAL FIXATION DEVICE, OPEN APPROACH: ICD-10-PCS | Performed by: ORTHOPAEDIC SURGERY

## 2023-03-15 PROCEDURE — 2500000003 HC RX 250 WO HCPCS

## 2023-03-15 PROCEDURE — 1200000000 HC SEMI PRIVATE

## 2023-03-15 DEVICE — IMPLANTABLE DEVICE: Type: IMPLANTABLE DEVICE | Site: FOOT | Status: FUNCTIONAL

## 2023-03-15 RX ORDER — SODIUM CHLORIDE 0.9 % (FLUSH) 0.9 %
5-40 SYRINGE (ML) INJECTION PRN
Status: DISCONTINUED | OUTPATIENT
Start: 2023-03-15 | End: 2023-03-16 | Stop reason: HOSPADM

## 2023-03-15 RX ORDER — DEXAMETHASONE SODIUM PHOSPHATE 10 MG/ML
INJECTION INTRAMUSCULAR; INTRAVENOUS PRN
Status: DISCONTINUED | OUTPATIENT
Start: 2023-03-15 | End: 2023-03-15 | Stop reason: SDUPTHER

## 2023-03-15 RX ORDER — MIDAZOLAM HYDROCHLORIDE 1 MG/ML
INJECTION INTRAMUSCULAR; INTRAVENOUS PRN
Status: DISCONTINUED | OUTPATIENT
Start: 2023-03-15 | End: 2023-03-15 | Stop reason: SDUPTHER

## 2023-03-15 RX ORDER — MORPHINE SULFATE 2 MG/ML
2 INJECTION, SOLUTION INTRAMUSCULAR; INTRAVENOUS EVERY 4 HOURS PRN
Status: DISCONTINUED | OUTPATIENT
Start: 2023-03-15 | End: 2023-03-16 | Stop reason: HOSPADM

## 2023-03-15 RX ORDER — DROPERIDOL 2.5 MG/ML
0.62 INJECTION, SOLUTION INTRAMUSCULAR; INTRAVENOUS
Status: CANCELLED | OUTPATIENT
Start: 2023-03-15 | End: 2023-03-16

## 2023-03-15 RX ORDER — ACETAMINOPHEN 325 MG/1
650 TABLET ORAL EVERY 6 HOURS PRN
Status: DISCONTINUED | OUTPATIENT
Start: 2023-03-15 | End: 2023-03-16 | Stop reason: HOSPADM

## 2023-03-15 RX ORDER — CEFAZOLIN SODIUM 1 G/3ML
INJECTION, POWDER, FOR SOLUTION INTRAMUSCULAR; INTRAVENOUS PRN
Status: DISCONTINUED | OUTPATIENT
Start: 2023-03-15 | End: 2023-03-15 | Stop reason: SDUPTHER

## 2023-03-15 RX ORDER — ACETAMINOPHEN 650 MG/1
650 SUPPOSITORY RECTAL EVERY 6 HOURS PRN
Status: DISCONTINUED | OUTPATIENT
Start: 2023-03-15 | End: 2023-03-16 | Stop reason: HOSPADM

## 2023-03-15 RX ORDER — POLYETHYLENE GLYCOL 3350 17 G/17G
17 POWDER, FOR SOLUTION ORAL DAILY PRN
Status: DISCONTINUED | OUTPATIENT
Start: 2023-03-15 | End: 2023-03-16 | Stop reason: HOSPADM

## 2023-03-15 RX ORDER — ROCURONIUM BROMIDE 10 MG/ML
INJECTION, SOLUTION INTRAVENOUS PRN
Status: DISCONTINUED | OUTPATIENT
Start: 2023-03-15 | End: 2023-03-15 | Stop reason: SDUPTHER

## 2023-03-15 RX ORDER — SODIUM CHLORIDE 9 MG/ML
INJECTION, SOLUTION INTRAVENOUS CONTINUOUS PRN
Status: DISCONTINUED | OUTPATIENT
Start: 2023-03-15 | End: 2023-03-15 | Stop reason: SDUPTHER

## 2023-03-15 RX ORDER — MIDAZOLAM HYDROCHLORIDE 2 MG/2ML
2 INJECTION, SOLUTION INTRAMUSCULAR; INTRAVENOUS
Status: CANCELLED | OUTPATIENT
Start: 2023-03-15 | End: 2023-03-16

## 2023-03-15 RX ORDER — SODIUM CHLORIDE 9 MG/ML
INJECTION, SOLUTION INTRAVENOUS PRN
Status: DISCONTINUED | OUTPATIENT
Start: 2023-03-15 | End: 2023-03-16 | Stop reason: HOSPADM

## 2023-03-15 RX ORDER — FENTANYL CITRATE 50 UG/ML
INJECTION, SOLUTION INTRAMUSCULAR; INTRAVENOUS PRN
Status: DISCONTINUED | OUTPATIENT
Start: 2023-03-15 | End: 2023-03-15 | Stop reason: SDUPTHER

## 2023-03-15 RX ORDER — LIDOCAINE HYDROCHLORIDE 10 MG/ML
INJECTION, SOLUTION INFILTRATION; PERINEURAL PRN
Status: DISCONTINUED | OUTPATIENT
Start: 2023-03-15 | End: 2023-03-15 | Stop reason: ALTCHOICE

## 2023-03-15 RX ORDER — ONDANSETRON 2 MG/ML
4 INJECTION INTRAMUSCULAR; INTRAVENOUS EVERY 6 HOURS PRN
Status: DISCONTINUED | OUTPATIENT
Start: 2023-03-15 | End: 2023-03-16 | Stop reason: HOSPADM

## 2023-03-15 RX ORDER — ACETAMINOPHEN 325 MG/1
650 TABLET ORAL
Status: CANCELLED | OUTPATIENT
Start: 2023-03-15 | End: 2023-03-16

## 2023-03-15 RX ORDER — SODIUM CHLORIDE 9 MG/ML
INJECTION, SOLUTION INTRAVENOUS CONTINUOUS
Status: DISCONTINUED | OUTPATIENT
Start: 2023-03-15 | End: 2023-03-16

## 2023-03-15 RX ORDER — MELOXICAM 7.5 MG/1
7.5 TABLET ORAL DAILY
Status: DISCONTINUED | OUTPATIENT
Start: 2023-03-15 | End: 2023-03-16 | Stop reason: HOSPADM

## 2023-03-15 RX ORDER — SODIUM CHLORIDE 0.9 % (FLUSH) 0.9 %
5-40 SYRINGE (ML) INJECTION PRN
Status: CANCELLED | OUTPATIENT
Start: 2023-03-15

## 2023-03-15 RX ORDER — SODIUM CHLORIDE 0.9 % (FLUSH) 0.9 %
5-40 SYRINGE (ML) INJECTION EVERY 12 HOURS SCHEDULED
Status: DISCONTINUED | OUTPATIENT
Start: 2023-03-15 | End: 2023-03-16 | Stop reason: HOSPADM

## 2023-03-15 RX ORDER — HYDRALAZINE HYDROCHLORIDE 20 MG/ML
5 INJECTION INTRAMUSCULAR; INTRAVENOUS
Status: CANCELLED | OUTPATIENT
Start: 2023-03-15

## 2023-03-15 RX ORDER — SODIUM CHLORIDE, SODIUM LACTATE, POTASSIUM CHLORIDE, CALCIUM CHLORIDE 600; 310; 30; 20 MG/100ML; MG/100ML; MG/100ML; MG/100ML
INJECTION, SOLUTION INTRAVENOUS CONTINUOUS PRN
Status: DISCONTINUED | OUTPATIENT
Start: 2023-03-15 | End: 2023-03-15 | Stop reason: SDUPTHER

## 2023-03-15 RX ORDER — LABETALOL HYDROCHLORIDE 5 MG/ML
5 INJECTION, SOLUTION INTRAVENOUS
Status: CANCELLED | OUTPATIENT
Start: 2023-03-15

## 2023-03-15 RX ORDER — SODIUM CHLORIDE 9 MG/ML
25 INJECTION, SOLUTION INTRAVENOUS PRN
Status: CANCELLED | OUTPATIENT
Start: 2023-03-15

## 2023-03-15 RX ORDER — SODIUM CHLORIDE 0.9 % (FLUSH) 0.9 %
5-40 SYRINGE (ML) INJECTION EVERY 12 HOURS SCHEDULED
Status: CANCELLED | OUTPATIENT
Start: 2023-03-15

## 2023-03-15 RX ORDER — DIPHENHYDRAMINE HYDROCHLORIDE 50 MG/ML
12.5 INJECTION INTRAMUSCULAR; INTRAVENOUS
Status: CANCELLED | OUTPATIENT
Start: 2023-03-15 | End: 2023-03-16

## 2023-03-15 RX ORDER — KETAMINE HCL IN NACL, ISO-OSM 100MG/10ML
SYRINGE (ML) INJECTION PRN
Status: DISCONTINUED | OUTPATIENT
Start: 2023-03-15 | End: 2023-03-15 | Stop reason: SDUPTHER

## 2023-03-15 RX ORDER — ONDANSETRON 2 MG/ML
INJECTION INTRAMUSCULAR; INTRAVENOUS PRN
Status: DISCONTINUED | OUTPATIENT
Start: 2023-03-15 | End: 2023-03-15 | Stop reason: SDUPTHER

## 2023-03-15 RX ORDER — PROPOFOL 10 MG/ML
INJECTION, EMULSION INTRAVENOUS PRN
Status: DISCONTINUED | OUTPATIENT
Start: 2023-03-15 | End: 2023-03-15 | Stop reason: SDUPTHER

## 2023-03-15 RX ORDER — LIDOCAINE HYDROCHLORIDE 20 MG/ML
INJECTION, SOLUTION INTRAVENOUS PRN
Status: DISCONTINUED | OUTPATIENT
Start: 2023-03-15 | End: 2023-03-15 | Stop reason: SDUPTHER

## 2023-03-15 RX ORDER — MORPHINE SULFATE 4 MG/ML
4 INJECTION, SOLUTION INTRAMUSCULAR; INTRAVENOUS EVERY 4 HOURS PRN
Status: DISCONTINUED | OUTPATIENT
Start: 2023-03-15 | End: 2023-03-16 | Stop reason: HOSPADM

## 2023-03-15 RX ORDER — ONDANSETRON 2 MG/ML
4 INJECTION INTRAMUSCULAR; INTRAVENOUS
Status: CANCELLED | OUTPATIENT
Start: 2023-03-15 | End: 2023-03-16

## 2023-03-15 RX ORDER — ONDANSETRON 4 MG/1
4 TABLET, ORALLY DISINTEGRATING ORAL EVERY 8 HOURS PRN
Status: DISCONTINUED | OUTPATIENT
Start: 2023-03-15 | End: 2023-03-16 | Stop reason: HOSPADM

## 2023-03-15 RX ORDER — IPRATROPIUM BROMIDE AND ALBUTEROL SULFATE 2.5; .5 MG/3ML; MG/3ML
1 SOLUTION RESPIRATORY (INHALATION)
Status: CANCELLED | OUTPATIENT
Start: 2023-03-15 | End: 2023-03-16

## 2023-03-15 RX ORDER — ACETAMINOPHEN 325 MG/1
650 TABLET ORAL EVERY 6 HOURS SCHEDULED
Status: DISCONTINUED | OUTPATIENT
Start: 2023-03-15 | End: 2023-03-16 | Stop reason: HOSPADM

## 2023-03-15 RX ADMIN — FENTANYL CITRATE 150 MCG: 50 INJECTION, SOLUTION INTRAMUSCULAR; INTRAVENOUS at 16:37

## 2023-03-15 RX ADMIN — Medication 30 MG: at 16:46

## 2023-03-15 RX ADMIN — MIDAZOLAM 2 MG: 1 INJECTION INTRAMUSCULAR; INTRAVENOUS at 16:34

## 2023-03-15 RX ADMIN — HYDROXYZINE PAMOATE 25 MG: 25 CAPSULE ORAL at 21:09

## 2023-03-15 RX ADMIN — ROCURONIUM BROMIDE 50 MG: 10 INJECTION, SOLUTION INTRAVENOUS at 16:37

## 2023-03-15 RX ADMIN — SODIUM CHLORIDE: 9 INJECTION, SOLUTION INTRAVENOUS at 16:31

## 2023-03-15 RX ADMIN — PROPOFOL 200 MG: 10 INJECTION, EMULSION INTRAVENOUS at 16:37

## 2023-03-15 RX ADMIN — QUETIAPINE FUMARATE 50 MG: 25 TABLET ORAL at 09:04

## 2023-03-15 RX ADMIN — SUGAMMADEX 136 MG: 100 INJECTION, SOLUTION INTRAVENOUS at 17:33

## 2023-03-15 RX ADMIN — CEFAZOLIN 2 G: 1 INJECTION, POWDER, FOR SOLUTION INTRAMUSCULAR; INTRAVENOUS at 16:40

## 2023-03-15 RX ADMIN — ACETAMINOPHEN 650 MG: 325 TABLET ORAL at 21:08

## 2023-03-15 RX ADMIN — MIRTAZAPINE 45 MG: 15 TABLET, FILM COATED ORAL at 21:11

## 2023-03-15 RX ADMIN — LIDOCAINE HYDROCHLORIDE 80 MG: 20 INJECTION, SOLUTION INTRAVENOUS at 16:37

## 2023-03-15 RX ADMIN — OXYCODONE HYDROCHLORIDE 10 MG: 10 TABLET ORAL at 04:43

## 2023-03-15 RX ADMIN — SODIUM CHLORIDE, POTASSIUM CHLORIDE, SODIUM LACTATE AND CALCIUM CHLORIDE: 600; 310; 30; 20 INJECTION, SOLUTION INTRAVENOUS at 16:39

## 2023-03-15 RX ADMIN — FENTANYL CITRATE 50 MCG: 50 INJECTION, SOLUTION INTRAMUSCULAR; INTRAVENOUS at 17:46

## 2023-03-15 RX ADMIN — SODIUM CHLORIDE, PRESERVATIVE FREE 10 ML: 5 INJECTION INTRAVENOUS at 21:09

## 2023-03-15 RX ADMIN — SODIUM CHLORIDE 0.6 MCG/KG/HR: 9 INJECTION, SOLUTION INTRAVENOUS at 16:45

## 2023-03-15 RX ADMIN — DEXAMETHASONE SODIUM PHOSPHATE 10 MG: 10 INJECTION INTRAMUSCULAR; INTRAVENOUS at 16:37

## 2023-03-15 RX ADMIN — Medication 20 MG: at 17:47

## 2023-03-15 RX ADMIN — SODIUM CHLORIDE: 9 INJECTION, SOLUTION INTRAVENOUS at 21:27

## 2023-03-15 RX ADMIN — MORPHINE SULFATE 4 MG: 4 INJECTION, SOLUTION INTRAMUSCULAR; INTRAVENOUS at 21:09

## 2023-03-15 RX ADMIN — ONDANSETRON 4 MG: 2 INJECTION INTRAMUSCULAR; INTRAVENOUS at 17:29

## 2023-03-15 RX ADMIN — BUPRENORPHINE HYDROCHLORIDE AND NALOXONE HYDROCHLORIDE DIHYDRATE 2 TABLET: 8; 2 TABLET SUBLINGUAL at 09:04

## 2023-03-15 RX ADMIN — FENTANYL CITRATE 50 MCG: 50 INJECTION, SOLUTION INTRAMUSCULAR; INTRAVENOUS at 17:16

## 2023-03-15 ASSESSMENT — PAIN DESCRIPTION - ORIENTATION
ORIENTATION: LEFT
ORIENTATION: LEFT

## 2023-03-15 ASSESSMENT — PAIN SCALES - GENERAL
PAINLEVEL_OUTOF10: 10
PAINLEVEL_OUTOF10: 6

## 2023-03-15 ASSESSMENT — PAIN DESCRIPTION - LOCATION
LOCATION: ANKLE;FOOT
LOCATION: FOOT

## 2023-03-15 ASSESSMENT — PAIN DESCRIPTION - PAIN TYPE: TYPE: SURGICAL PAIN

## 2023-03-15 ASSESSMENT — PAIN DESCRIPTION - DESCRIPTORS: DESCRIPTORS: SHARP

## 2023-03-15 ASSESSMENT — PAIN DESCRIPTION - FREQUENCY: FREQUENCY: CONTINUOUS

## 2023-03-15 NOTE — PROGRESS NOTES
[address]        Date:3/15/2023                                                  Patient Name: Parish Jacobs    MRN: [id number]    : 1980    Room: [facility]          Evaluating OT: Mattie Brown OTR/L; [id number]       Referring Provider: Mikayla Edward MD    Specific Provider Orders/Date: OT Eval and Treat 03/15/23       Diagnosis: Left Lisfranc dislocation;  Closed displaced fracture of distal phalanx of lesser toe of left foot. Surgery: None at time of eval     Pertinent Medical History:  has no past medical history on file.      Recommended Adaptive Equipment: AE for LE bathing and dressing PRN     Precautions:  Fall Risk, NWB LLE, cognition, bedside sitter, +alarms     Assessment of current deficits    [x] Functional mobility  [x]ADLs  [x] Strength               [x]Cognition    [x] Functional transfers   [x] IADLs         [x] Safety Awareness   [x]Endurance    [x] Fine Coordination              [x] Balance      [] Vision/perception   []Sensation     []Gross Motor Coordination  [] ROM  [] Delirium                   [] Motor Control     OT PLAN OF CARE   OT POC based on physician orders, patient diagnosis and results of clinical assessment    Frequency/Duration 3-5 days/wk for 2 weeks PRN   Specific OT Treatment Interventions to include:   * Instruction/training on adapted ADL techniques and AE recommendations to increase functional independence within precautions       * Training on energy conservation strategies, correct breathing pattern and techniques to improve independence/tolerance for self-care routine  * Functional transfer/mobility training/DME recommendations for increased independence, safety, and fall prevention  * Patient/Family education to increase follow through with safety techniques and functional independence  * Recommendation of environmental modifications for increased safety with functional transfers/mobility and ADLs  * Therapeutic exercise to improve motor endurance, ROM, and functional strength for ADLs/functional transfers  * Therapeutic activities to facilitate/challenge dynamic balance, stand tolerance for increased safety and independence with ADLs  * Positioning to improve skin integrity, interaction with environment and functional independence    Home Living: Pt admitted from [address]. Prior Level of Function: IND with ADLs , IND with IADLs; engaged in functional mobility with use of  no AD  Driving: ?  Occupation: None reported    Pain Level: Pt c/o mod LLE pain; therapist provided repositioning techniques. Cognition: A&O: 3/4; Follows 1 step directions. Pt slightly impulsive. Decreased insight into deficits. Flight of ideas. Memory:  Fair   Sequencing:  Fair   Problem solving:  Fair   Judgement/safety:  Fair-     Functional Assessment:  AM-PAC Daily Activity Raw Score: 15/24   Initial Eval Status  Date: 3/15/23 Treatment Status  Date: STGs = LTGs  Time frame: 10-14 days   Feeding Setup   Independent    Grooming Stand by Assist   Modified [place]    UB Dressing Stand by Assist   Modified [place]    LB Dressing Minimal Assist to don RLE sock & show/Moderate Assist overall  Modified [place]    Bathing Moderate Assist  Modified [place]    Toileting Minimal Assist w/ urinal/Moderate Assist overall  Modified [place]    Bed Mobility  Supine to sit: SBA  Sit to supine: Stand by Assist   Supine to sit: Independent   Sit to supine: Independent    Functional Transfers Sit to stand:Minimal Assist   Stand to sit:Minimal Assist  Stand pivot: NT  Commode: NT  Sit to stand:Modified [place]    Stand to sit:Modified [place]   Stand pivot: Modified [place]   Commode: Modified [place]     Functional Mobility Minimal Assist  Use of ww ~10 steps forward & back.   Modified [place] w/ use of Appropriate AD Balance Sitting:     Static - Supervision     Dynamic - SBA  Standing: Minimal Assist w/ ww  Sitting:     Static: Independent      Dynamic: Independent   Standing: Modified [place]    Activity Tolerance Fair  Good   Visual/  Perceptual Glasses: None reported  Appears WFL        Safety Fair-  Good  during ADL completion following NWB LLE precautions      AROM (PROM) Strength Additional Info:    RUE  WFL 4/5 grossly tested good  and wfl FMC/dexterity noted during ADL tasks     LUE WFL 4/5 grossly tested Good  and wfl FMC/dexterity noted during ADL tasks       Hearing: WFL  Sensation:  No c/o numbness or tingling BUE  Tone: WFL BUE  Edema: Unremarkable    Comment: Cleared by RN to see pt. Upon arrival patient supine in bed and agreeable to OT session. At end of session, patient mcleod[ine in bed with call light and phone within reach, all lines and tubes intact. Overall patient demonstrated decreased independence and safety during completion of ADL/functional transfer/mobility tasks. Therapist facilitated ADL tasks, functional transfers, functional mobility, bed mobility to address safety awareness, implementation of fall prevention strategies, & functional engagement throughout daily activities. Pt demo'd decreased attention to task & impulsivity requiring increased time throughout session to maintain safety awareness. Pt would benefit from continued skilled OT to increase safety and independence with completion of ADL/IADL tasks for functional independence and quality of life. Treatment: OT treatment provided this date includes: ADL-  Instruction/training on safety and adapted techniques for completion of ADLs: Pt able to don RLE sock/shoe seated EOB w/ light assist to maintain dynamic sitting balance demo'ing R hip flexion. Pt engaged in light bathing task seated EOB requiring assist to reach LLE. Pt stood w/ assist to maintain static standing balance to utilize urinal for urine output.  Pt educated on activity modifications/adaptations to promote compliance of maintaining NWB LLE, safety awareness, & implementation of fall prevention strategies throughout ADLs. Mobility-  Instruction/training on safety and improved independence with bed mobility/functional transfers and functional mobility. Pt utilized ww to maintain static/dynamic standing balance throughout session. Pt required verbal cues for proper hand placement w/ ww & assist to maintain safe transfer progressions throughout session - pt impulsive. Sitting EOB ~8 minutes to improve dynamic sitting balance and activity tolerance during ADLs. Skilled positioning/alignment-  Proper Positioning/Alignment. Pt required assist/cues to maintain proper body mechanics/NWB LLE throughout session to promote skin/joint integrity, safety awareness, & implementation of fall prevention strategies throughout daily activities. Rehab Potential: Good for established goals     LTG: maximize independence with ADLs to return to PLOF    Patient and/or family were instructed on functional diagnosis, prognosis/goals and OT plan of care. Demonstrated fair understanding. Eval Complexity: Low  History: Expanded chart review of medical records and additional review of physical, cognitive, or psychosocial history related to current functional performance  Exam: 3+ performance deficits  Assistance/Modification: Min/mod assistance or modifications required to perform tasks. May have comorbidities that affect occupational performance. Evaluation time includes thorough review of current medical information, gathering information on past medical & social history & PLOF, completion of standardized testing, informal observation of tasks, consultation with other medical professions/disciplines, assessment of data & development of POC/goals.      Time In: 1:36p  Time Out: 2:00p  Total Treatment Time: 9 minutes    Min Units   OT Eval Low 80382  x     OT Eval Medium 69532 OT Eval High [id number]      OT Re-Eval [id number]       Therapeutic Ex [id number]       Therapeutic Activities 87593       ADL/Self Care 88612  9 1    Orthotic Management 18527       Manual 39700     Neuro Re-Ed 34083       Non-Billable Time          Evaluation Time additionally includes thorough review of current medical information, gathering information on past medical history/social history and prior level of function, interpretation of standardized testing/informal observation of tasks, assessment of data and development of plan of care and goals.               Dereje Wooten OTR/L; [id number]

## 2023-03-15 NOTE — PROGRESS NOTES
Floor Called, nurse to nurse given. Spoke with Monse Navarro RN . Patients test results review, VS reported to receiving nurse. Any and all important information regarding patient disclosed.

## 2023-03-15 NOTE — PROGRESS NOTES
Physical Therapy  Physical Therapy Initial Assessment       Name: Analy Davis  : 1980  MRN: [id number]      Date of Service: 3/15/2023    Evaluating PT:  Dennis Stevens PT, DPT [id number]    Room #:  9846/8566-C  Diagnosis:  Polysubstance abuse ([facility] [address] [F19.10]  Dislocation of tarsometatarsal joint, unspecified laterality, initial encounter [S93.326A]  Lisfranc dislocation, left, initial encounter [S93.325A]  Closed displaced fracture of distal phalanx of lesser toe of left foot, initial encounter [S92.532A]  PMHx/PSHx:   has no past medical history on file. Procedure/Surgery:  scheduled procedure 3/15/23: CLOSED REDUCTION PERC PINNING VS ORIF LEFT FOOT LISFRANC  Precautions: Falls,  NWB L LE, pink slip, alarms,      SUBJECTIVE:    Per chart review, pt is from generations. Pt PLOF unknown. Equipment Owned: none   Equipment Needs:  [employer]    OBJECTIVE:   Initial Evaluation  Date: 3/15/23 Treatment Short Term/ Long Term   Goals   AM-PAC 6 Clicks 34/86     Was pt agreeable to Eval/treatment? Yes      Does pt have pain? Moderate L foot pain     Bed Mobility  Rolling: Leo  Supine to sit: Leo  Sit to supine: Leo  Scooting: Leo  Rolling: Independent  Supine to sit: Independent  Sit to supine: Independent  Scooting: Independent   Transfers Sit to stand: Leo to [employer] (NWB LLE)  Stand to sit: Leo  Stand pivot: Leo with WW (NWB LLE)  Sit to stand: Independent  Stand to sit: Independent  Stand pivot: Independent   Ambulation    15 feet with Leo with WW (NWB LLE)   150 feet with [employer] Modified Independent   Stair negotiation: ascended and descended  NT  4 steps with 2 rail SBA   ROM BUE:  Defer to OT  BLE:  WFL, LLE deferred      Strength BUE:  Defer to OT  RLE: 3+/5, LLE deferred  Improve 1 MMT   Balance Sitting EOB:  SBA  Dynamic Standing:  Leo at [employer]  Sitting EOB:  Independent  Dynamic Standing:  Modified Independent at [employer]     Pt is A & O x 3. Pt lacks insight to situation and deficits.  Pt can be impulsive, frequent flight of ideas. Sensation:  denies numbness and tingling   Edema:  none noted. LLE ace wrapped. Vitals:  Pt asymptomatic during session. Therapeutic Exercises:  none this session. Patient education  Pt educated on role of PT, NWB LLE precautions, and safety with mobility. Patient response to education:   Pt verbalized understanding Pt demonstrated skill Pt requires further education in this area   Yes  Yes, cues  Reinforce      ASSESSMENT:    Conditions Requiring Skilled Therapeutic Intervention:    [x]Decreased strength     [x]Decreased ROM  [x]Decreased functional mobility  [x]Decreased balance   [x]Decreased endurance   [x]Decreased posture  []Decreased sensation  []Decreased coordination   []Decreased vision  [x]Decreased safety awareness   [x]Increased pain       Comments:  Pt was supine and agreeable to PT evaluation upon arrival. Pt impulsive with mobility, frequent flight of ideas and highly distractible. Pt completed bed mobility to EOB with assistance. Pt given demonstration of NWB LLE technique for transfers and ambulation using [employer]. Pt ambulated into bathroom to use urinal, pt voided. Pt able to maintain precautions during ambulation and dynamic standing at [employer]. Pt ambulated back to bed. Pt returned to supine with all needs in place prior to exit. Pt left with  and nurse. Patient would benefit from continued skilled PT to maximize functional mobility independence. Treatment:  Patient practiced and was instructed in the following treatment:    Bed Mobility: Verbal cues for proper positioning and sequencing to perform bed mobility to facilitate maximum independence. Transfers: Verbal cues for proper positioning and sequencing to perform transfers safely with maximum independence. NWB LLE, able to maintain. Gait Training: Verbal cues for proper positioning and sequencing using assistive device to maximize functional mobility independence.  NWB LLE, able to maintain. Skillful positioning in bed to protect skin and joint integrity. Pt education for NWB LLE during all mobility. Vitals and symptoms monitored during session. Pt's/ family goals   1. Get better    Prognosis is good for reaching above PT goals. Patient and or family understand(s) diagnosis, prognosis, and plan of care. Yes     PHYSICAL THERAPY PLAN OF CARE:    PT POC is established based on physician order and patient diagnosis     Referring provider/PT Order:    03/15/23 1000  PT eval and treat  Start:  03/15/23 1000,   End:  03/15/23 1000,   ONE TIME,   Standing Count:  1 Occurrences,   Rafat Garduno MD     Diagnosis:  Polysubstance abuse ([facility] [address] [F19.10]  Dislocation of tarsometatarsal joint, unspecified laterality, initial encounter [S93.326A]  Lisfranc dislocation, left, initial encounter [S93.325A]  Closed displaced fracture of distal phalanx of lesser toe of left foot, initial encounter [O49.940X]  Specific instructions for next treatment:  progress mobility as tolerated. Current Treatment Recommendations:     [x] Strengthening to improve independence with functional mobility   [x] ROM to improve independence with functional mobility   [x] Balance Training to improve static/dynamic balance and to reduce fall risk  [x] Endurance Training to improve activity tolerance during functional mobility   [x] Transfer Training to improve safety and independence with all functional transfers   [x] Gait Training to improve gait mechanics, endurance and assess need for appropriate assistive device  [x] Stair Training in preparation for safe discharge home and/or into the community   [] Positioning to prevent skin breakdown and contractures  [x] Safety and Education Training   [x] Patient/Caregiver Education   [x] HEP  [] Other     PT long term treatment goals are located in above grid    Frequency of treatments: 2-5x/week x 1-2 weeks.     Time in  1337  Time out  1400    Total Treatment Time  8 minutes     Evaluation Time includes thorough review of current medical information, gathering information on past medical history/social history and prior level of function, completion of standardized testing/informal observation of tasks, assessment of data and education on plan of care and goals.     CPT codes:  [x] Low Complexity PT evaluation 19933  [] Moderate Complexity PT evaluation 86257  [] High Complexity PT evaluation 57454  [] PT Re-evaluation 33403  [] Gait training 16675 0 minutes  [] Manual therapy 89932 0 minutes  [x] Therapeutic activities 03285 8 minutes  [] Therapeutic exercises 78250 0 minutes  [] Neuromuscular reeducation 86651 0 minutes       Elie Stevens PT, DPT  [id number]

## 2023-03-15 NOTE — PROGRESS NOTES
Patient states that he had a backpack filled with jewelry and other valuables when he arrived at the facility. When patient was admitted to the unit patient had a pair of black jeans on, underwear, shirt, t-shirt, and two shoes. Patient states that he had a red bag and a black bag that had big dorothy earrings a lot of jewelry, cell phone, money, and credit cards. None of these items were present when arrived on this unit. Call placed to [facility], spoke to [place] who stated that patient was not discharged from facility and would not have taken items with him to the ER. [place] then placed the call on hold. When she returned to the line she stated that the patients belongings were at the facility.

## 2023-03-15 NOTE — ANESTHESIA POSTPROCEDURE EVALUATION
Department of Anesthesiology  Postprocedure Note    Patient: Grecia Gamez  MRN: [id number]  YOB: 1980  Date of evaluation: 3/15/2023      Procedure Summary     Date: 03/15/23 Room / Location: [facility] OR 08 / [facility]    Anesthesia Start: 2617 Anesthesia Stop: 1759    Procedure: OPEN REDUCTION INTERNAL FIXATION LEFT FOOT FIRST TMT JOINT, SECOND TMT JOINT, THIRD TMT JOINT, 2 SCREWS 1 PIN (Left: Leg Lower) Diagnosis:       Lisfranc dislocation, left, initial encounter      (LEFT FOOT LISFRANC INJURY)    Surgeons: Skip Simon DO Responsible Provider: Deejay Macedo MD    Anesthesia Type: General ASA Status: 3          Anesthesia Type: General    Maddy Phase I: Maddy Score: 8    Maddy Phase II:        Anesthesia Post Evaluation    Patient location during evaluation: PACU  Patient participation: complete - patient participated  Level of consciousness: awake and alert  Airway patency: patent  Nausea & Vomiting: no nausea and no vomiting  Complications: no  Cardiovascular status: hemodynamically stable  Respiratory status: acceptable  Hydration status: euvolemic

## 2023-03-15 NOTE — PLAN OF CARE
Problem: Discharge Planning  Goal: Discharge to home or other facility with appropriate resources  3/15/2023 1039 by Elvis Sky RN  Outcome: Progressing  3/15/2023 0148 by Joaquim Sandhu RN  Outcome: Progressing     Problem: Safety - Adult  Goal: Free from fall injury  3/15/2023 1039 by Elvis Sky RN  Outcome: Progressing  3/15/2023 0148 by Joaquim Sandhu RN  Outcome: Progressing     Problem: ABCDS Injury Assessment  Goal: Absence of physical injury  3/15/2023 0148 by Joaquim Sandhu RN  Outcome: Progressing

## 2023-03-15 NOTE — OP NOTE
Operative Note      Patient: Trang Macias  YOB: 1980  MRN: [id number]    Date of Procedure: 3/15/2023    Pre-Op Diagnosis: LEFT FOOT LISFRANC INJURY    Post-Op Diagnosis: Same       Procedure(s):  OPEN REDUCTION INTERNAL FIXATION LEFT FOOT FIRST TARSOMETATARSAL JOINT  OPEN REDUCTION INTERNAL FIXATION LEFT FOOT SECOND TARSOMETATARSAL JOINT  OPEN REDUCTION INTERNAL FIXATION LEFT FOOT THIRD TARSOMETATARSAL JOINT    Surgeon(s):  Umm Price DO    Assistant:   Resident:  Sami Ricardo DO; Rita Ceballos DO    Anesthesia: General    Estimated Blood Loss (mL): less than 50     Complications: None    Specimens:   * No specimens in log *    Implants:  Implant Name Type Inv. Item Serial No.  Lot No. LRB No. Used Action   SCREW BNE L34MM DIA3. 5MM S STL DAVID FULL THRD HD HEX SOCK - [id number]  SCREW BNE L34MM DIA3. 5MM S STL DAVID FULL THRD HD HEX SOCK  [employer]  Left 1 Implanted   SCREW BNE L40MM DIA3. 5MM S STL DAVID FULL THRD HD HEX SOCK - [id number]  SCREW BNE L40MM DIA3. 5MM S STL DAVID FULL THRD HD HEX SOCK  DE[employer]  Left 1 Implanted         Drains: * No LDAs found *    Findings: Displaced lis franc injury. Detailed Description of Procedure:     HISTORY: The patient is a 38y.o. year old male with history of above preop diagnosis. I explained the risk, benefits, expected outcome, and alternatives to the procedure. Patient understands and is in agreement. OPERATIVE COURSE:  The patient was seen and identified outside the operative suite, in which the operative site was marked as appropriate by patient, surgeon, staff, and anesthesia. The patient was then taken into the operative suite, transferred to the operative table with all bony prominences and neurovascular structures well padded and protected. A tourniquet was placed high on the thigh of the operative extremity.  The patient was sedated under the care of the anesthesia team. The operative site was prepped and draped in standard sterile fashion. The tourniquet was not inflated. After the time out, our lisfranc joint as well as our first ray were drawn out. We first turned our attention to the lisfranc joint and made and made a small incision about the lateral second ray. Careful dissection was taken down to bone. A vidales clamp was used to reduce the lisfranc joint. This was confirmed under fluoro. A k-wire was used to span the lisfranc articulation. This was confirmed on both the AP and lateral projection. We then turned our attention to the first TMT which was used and now was congruent and appropriate aligned with fluoroscopic imaging on all views. We placed another k-wire retrograde through the first TMTJ. After this was confirmed to be appropriate under fluoroscopy, the wires were over drilled and 3.5 fully threaded threaded cannulated screws were placed. These were confirmed to be the appropriate length on xray. We then evaluated under stress views, where the 3rd TMT was still subluxated. This was reduced now through the previous incision using a Mohan Fitting to relocate the third TMT joint. This was confirmed with fluoroscopic imaging. This was now stabilized using a Jerry wire with fluoroscopic guidance placed in a retrograde fashion. The wire was bent and cut. Once all hardware was in position. AP, lateral, and stress views were obtained showing anatomic reduction of the midfoot. At this point in time, all wounds were then copiously irrigated with sterile normal saline. Skin was closed with 3-0 nylon in an interrupted fashion. Compartments were soft and compressible. Sterile dressings were placed as well as a well-padded posterior splint. The patient was then reversed from anesthesia without complication and transferred back to the hospital room bed by multiple individuals in a safe fashion. The patient was taken to the PACU in a stable condition.      Disposition:   (1) Strict nonweightbearing of the right lower extremity. (2) Aggressive Ice and elevation to left lower extremity. (3) Pain medications and aspirin 325 BID DVT prophylaxis. (4) Patient will follow up in the office in 2 weeks for suture removal as well as repeat x-rays of the left foot and potentially if his skin looks good, will transition him into a cast at that time. It should be noted that Dr. Wolf Kearney was present for the entirety of the case. Electronically signed by Karie Wilson DO on 3/15/2023     Orthopaedic Attending    I was asked to partake in the care of this patient due to [address] and surgeon availability. Had lengthy discussion with patient regarding their diagnosis, typical prognosis, and expected outcomes. We reviewed the possible complications from the injury itself despite treatment chosen. We also discussed treatment options including nonoperative managements versus surgical management, along with risks and benefits of each. Patient has elected for surgical management despite associated risks. Discussed that his injury certainly life-changing to the overall outcome and function of his foot moving forward despite best surgical attempts. Patient verbalized understanding and elected to proceed. I explained the risks and complications of the recommended surgery with the patient at length, as well as discussed potential treatment alternatives including nonoperative management. These risks include but are not limited to death or complication from anesthesia, continued pain, nerve tendon or vascular injury, infection, nonunion or malunion, symptomatic hardware or hardware failure, deep vein thrombosis or pulmonary embolism, stiffness, hardware breakage, unforeseen complications, and need for further surgery, etc.  Patient understood this, asked appropriate questions, which were all answered, and he has elected to proceed with the procedure.     I was present scrubbed throughout the entire procedure and involved with all aspects of patient's surgical care today. Electronically signed by   Romy Franco DO  3/15/2023     NOTE: This report was transcribed using voice recognition software.  Every effort was made to ensure accuracy; however, inadvertent computerized transcription errors may be present

## 2023-03-15 NOTE — PROGRESS NOTES
Hospitalist Progress Note    Subjective    Clinically improving. Feeling better. Stable overnight. No other overnight issues reported. No CP, SOB, palpitations, blurred vision, HA, lightheadedness, LOC or focal neurological deficits    Exam:  BP (!) 143/93   Pulse 92   Temp 97.2 °F (36.2 °C) (Temporal)   Resp 18   Ht 5' 11\" (1.803 m)   Wt 150 lb (68 kg)   SpO2 98%   BMI 20.92 kg/m²   General appearance: No apparent distress, appears stated age and cooperative. HEENT: Pupils equal, round, and reactive to light. Conjunctivae/corneas clear. Neck: Supple. No jugular venous distention. Trachea midline. Respiratory:  Normal respiratory effort. Clear to auscultation, bilaterally without Rales/Wheezes/Rhonchi. Cardiovascular: Regular rate and rhythm with normal S1/S2 without murmurs, rubs or gallops. Abdomen: Soft, non-tender, non-distended with normal bowel sounds. Musculoskeletal: No clubbing, cyanosis or edema bilaterally. Brisk capillary refill. 2+ lower extremity pulses (dorsalis pedis). Skin:  No rashes    Neurologic: awake, alert and following commands     Medications:  Reviewed    Infusion Medications   Scheduled Medications    ibuprofen  400 mg Oral Once    buprenorphine-naloxone  2 tablet SubLINGual Daily    mirtazapine  45 mg Oral Nightly    QUEtiapine  50 mg Oral Daily    hydrOXYzine pamoate  25 mg Oral Nightly     PRN Meds: oxyCODONE HCl    I/O  No intake or output data in the 24 hours ending 03/15/23 0944    Labs:   Recent Labs     03/14/23  0732   WBC 11.3   HGB 15.4   HCT 45.7          Recent Labs     03/14/23  0732      K 3.4*   CL 98   CO2 27   BUN 10   CREATININE 0.9   CALCIUM 9.8       No results for input(s): PROT, ALB, ALKPHOS, ALT, AST, BILITOT, AMYLASE, LIPASE in the last 72 hours. Recent Labs     03/14/23  0732   INR 1.3       No results for input(s): Ceil Coke in the last 72 hours.     Chronic labs:  Lab Results   Component Value Date    INR 1.3 03/14/2023       Radiology:  Imaging studies reviewed today.    ASSESSMENT:    Left foot Lisfranc fracture dislocation   History of paranoia      PLAN:  CT foot left without contrast:  Lisfranc injury with complete lateral incongruity, homolateral type associated avulsion fractures from the proximal first through fifth metatarsals.  X-ray foot left:  Moderately displaced mid foot fracture dislocation of the second through fifth digits with mild dorsal lateral subluxation and complete rupture of the Lisfranc ligament.  Patient receiving optimal pain control, splint in place, Ortho consulted for possible procedure today.  Continue with PT/OT.  We will manage paranoia with home medications.  Psychiatry consulted.      Diet: Diet NPO Exceptions are: Sips of Water with Meds  Code Status: No Order  PT/OT Eval Status:   Will follow   DVT Prophylaxis:   On hold   Recommended disposition at discharge:  Home    +++++++++++++++++++++++++++++++++++++++++++++++++  Elbert Molina MD   [facility].  +++++++++++++++++++++++++++++++++++++++++++++++++  NOTE: This report was transcribed using voice recognition software. Every effort was made to ensure accuracy; however, inadvertent computerized transcription errors may be present.

## 2023-03-15 NOTE — PROGRESS NOTES
Clarified with Dr. Denton Andrade if pt needs sitter at the bedside.  Per Dr. Denton Andrade, keep sitter and they will put in order

## 2023-03-16 VITALS
BODY MASS INDEX: 21 KG/M2 | OXYGEN SATURATION: 96 % | RESPIRATION RATE: 18 BRPM | SYSTOLIC BLOOD PRESSURE: 116 MMHG | HEIGHT: 71 IN | WEIGHT: 150 LBS | HEART RATE: 75 BPM | TEMPERATURE: 98.1 F | DIASTOLIC BLOOD PRESSURE: 80 MMHG

## 2023-03-16 LAB
ALBUMIN SERPL-MCNC: 3.8 G/DL (ref 3.5–5.2)
ANION GAP SERPL CALCULATED.3IONS-SCNC: 11 MMOL/L (ref 7–16)
BUN SERPL-MCNC: 10 MG/DL (ref 6–20)
CALCIUM SERPL-MCNC: 9.5 MG/DL (ref 8.6–10.2)
CHLORIDE SERPL-SCNC: 101 MMOL/L (ref 98–107)
CK SERPL-CCNC: 479 U/L (ref 20–200)
CO2 SERPL-SCNC: 28 MMOL/L (ref 22–29)
CREAT SERPL-MCNC: 0.9 MG/DL (ref 0.7–1.2)
GLUCOSE SERPL-MCNC: 157 MG/DL (ref 74–99)
MAGNESIUM SERPL-MCNC: 1.8 MG/DL (ref 1.6–2.6)
PHOSPHATE SERPL-MCNC: 2 MG/DL (ref 2.5–4.5)
POTASSIUM SERPL-SCNC: 3.5 MMOL/L (ref 3.5–5)
SARS-COV-2 RDRP RESP QL NAA+PROBE: NOT DETECTED
SODIUM SERPL-SCNC: 140 MMOL/L (ref 132–146)

## 2023-03-16 PROCEDURE — 83735 ASSAY OF MAGNESIUM: CPT

## 2023-03-16 PROCEDURE — 6370000000 HC RX 637 (ALT 250 FOR IP): Performed by: STUDENT IN AN ORGANIZED HEALTH CARE EDUCATION/TRAINING PROGRAM

## 2023-03-16 PROCEDURE — 36415 COLL VENOUS BLD VENIPUNCTURE: CPT

## 2023-03-16 PROCEDURE — 6370000000 HC RX 637 (ALT 250 FOR IP): Performed by: INTERNAL MEDICINE

## 2023-03-16 PROCEDURE — 87635 SARS-COV-2 COVID-19 AMP PRB: CPT

## 2023-03-16 PROCEDURE — 82550 ASSAY OF CK (CPK): CPT

## 2023-03-16 PROCEDURE — 2580000003 HC RX 258: Performed by: STUDENT IN AN ORGANIZED HEALTH CARE EDUCATION/TRAINING PROGRAM

## 2023-03-16 PROCEDURE — 6360000002 HC RX W HCPCS: Performed by: INTERNAL MEDICINE

## 2023-03-16 PROCEDURE — 80069 RENAL FUNCTION PANEL: CPT

## 2023-03-16 PROCEDURE — 6360000002 HC RX W HCPCS: Performed by: STUDENT IN AN ORGANIZED HEALTH CARE EDUCATION/TRAINING PROGRAM

## 2023-03-16 RX ORDER — POTASSIUM CHLORIDE 20 MEQ/1
40 TABLET, EXTENDED RELEASE ORAL ONCE
Status: COMPLETED | OUTPATIENT
Start: 2023-03-16 | End: 2023-03-16

## 2023-03-16 RX ORDER — OXYCODONE HYDROCHLORIDE 10 MG/1
10 TABLET ORAL EVERY 6 HOURS PRN
Qty: 6 TABLET | Refills: 0 | Status: SHIPPED | OUTPATIENT
Start: 2023-03-16 | End: 2023-03-19

## 2023-03-16 RX ADMIN — OXYCODONE HYDROCHLORIDE 10 MG: 10 TABLET ORAL at 00:21

## 2023-03-16 RX ADMIN — MELOXICAM 7.5 MG: 7.5 TABLET ORAL at 00:21

## 2023-03-16 RX ADMIN — MELOXICAM 7.5 MG: 7.5 TABLET ORAL at 08:33

## 2023-03-16 RX ADMIN — MORPHINE SULFATE 4 MG: 4 INJECTION, SOLUTION INTRAMUSCULAR; INTRAVENOUS at 03:11

## 2023-03-16 RX ADMIN — MORPHINE SULFATE 4 MG: 4 INJECTION, SOLUTION INTRAMUSCULAR; INTRAVENOUS at 14:29

## 2023-03-16 RX ADMIN — MORPHINE SULFATE 4 MG: 4 INJECTION, SOLUTION INTRAMUSCULAR; INTRAVENOUS at 08:45

## 2023-03-16 RX ADMIN — QUETIAPINE FUMARATE 50 MG: 25 TABLET ORAL at 08:32

## 2023-03-16 RX ADMIN — ACETAMINOPHEN 650 MG: 325 TABLET ORAL at 11:37

## 2023-03-16 RX ADMIN — POTASSIUM CHLORIDE 40 MEQ: 1500 TABLET, EXTENDED RELEASE ORAL at 14:29

## 2023-03-16 RX ADMIN — BUPRENORPHINE HYDROCHLORIDE AND NALOXONE HYDROCHLORIDE DIHYDRATE 2 TABLET: 8; 2 TABLET SUBLINGUAL at 10:15

## 2023-03-16 RX ADMIN — CEFAZOLIN 2000 MG: 2 INJECTION, POWDER, FOR SOLUTION INTRAMUSCULAR; INTRAVENOUS at 08:32

## 2023-03-16 RX ADMIN — SODIUM CHLORIDE, PRESERVATIVE FREE 10 ML: 5 INJECTION INTRAVENOUS at 08:37

## 2023-03-16 RX ADMIN — OXYCODONE HYDROCHLORIDE 10 MG: 10 TABLET ORAL at 11:37

## 2023-03-16 RX ADMIN — ACETAMINOPHEN 650 MG: 325 TABLET ORAL at 05:41

## 2023-03-16 RX ADMIN — ACETAMINOPHEN 650 MG: 325 TABLET ORAL at 00:10

## 2023-03-16 RX ADMIN — OXYCODONE HYDROCHLORIDE 10 MG: 10 TABLET ORAL at 06:23

## 2023-03-16 RX ADMIN — CEFAZOLIN 2000 MG: 2 INJECTION, POWDER, FOR SOLUTION INTRAMUSCULAR; INTRAVENOUS at 00:08

## 2023-03-16 RX ADMIN — ASPIRIN 325 MG: 325 TABLET, COATED ORAL at 08:32

## 2023-03-16 ASSESSMENT — PAIN DESCRIPTION - DESCRIPTORS
DESCRIPTORS: ACHING;DISCOMFORT;SHARP
DESCRIPTORS: SHARP
DESCRIPTORS: ACHING;DISCOMFORT;DULL
DESCRIPTORS: SHARP
DESCRIPTORS: SHARP
DESCRIPTORS: ACHING;DISCOMFORT;DULL
DESCRIPTORS: SHARP
DESCRIPTORS: SPASM;SHARP

## 2023-03-16 ASSESSMENT — PAIN DESCRIPTION - PAIN TYPE
TYPE: SURGICAL PAIN
TYPE: SURGICAL PAIN

## 2023-03-16 ASSESSMENT — PAIN SCALES - GENERAL
PAINLEVEL_OUTOF10: 5
PAINLEVEL_OUTOF10: 8
PAINLEVEL_OUTOF10: 9
PAINLEVEL_OUTOF10: 8
PAINLEVEL_OUTOF10: 7
PAINLEVEL_OUTOF10: 6
PAINLEVEL_OUTOF10: 5
PAINLEVEL_OUTOF10: 8
PAINLEVEL_OUTOF10: 6
PAINLEVEL_OUTOF10: 10
PAINLEVEL_OUTOF10: 9

## 2023-03-16 ASSESSMENT — PAIN DESCRIPTION - ONSET
ONSET: ON-GOING

## 2023-03-16 ASSESSMENT — PAIN DESCRIPTION - FREQUENCY
FREQUENCY: CONTINUOUS

## 2023-03-16 ASSESSMENT — PAIN DESCRIPTION - ORIENTATION
ORIENTATION: LEFT

## 2023-03-16 ASSESSMENT — PAIN DESCRIPTION - LOCATION
LOCATION: FOOT
LOCATION: FOOT;LEG
LOCATION: FOOT;LEG

## 2023-03-16 ASSESSMENT — PAIN - FUNCTIONAL ASSESSMENT
PAIN_FUNCTIONAL_ASSESSMENT: PREVENTS OR INTERFERES SOME ACTIVE ACTIVITIES AND ADLS
PAIN_FUNCTIONAL_ASSESSMENT: PREVENTS OR INTERFERES SOME ACTIVE ACTIVITIES AND ADLS
PAIN_FUNCTIONAL_ASSESSMENT: ACTIVITIES ARE NOT PREVENTED

## 2023-03-16 NOTE — CONSULTS
This patient has been discussed with [facility] staff as well as [facility] leadership team.  The plan is for this patient to return back to [facility] for continuity of care and to continue his psychiatric treatment. Per the chart pink slip has already been signed for patient to return to [facility]. Please reach out with any questions or concerns. I discussed with with nursing staff.

## 2023-03-16 NOTE — CARE COORDINATION
3/16/2023 social work transition of care planning  Divya spoke with Sean at [facility]. Divya had faxed clinicals this am in anticipation to get pt back to [facility]. Per Sean, pt will need to sign a voluntary or be pink slipped back to their facility. Suzy is also requesting wound care notes(if any),supplies,CK levels and progress note from attending stating \"medically cleared to return to [facility]\". Divya messaged attending. Divya will fax ck levels once completed in chart. Attending signed pink slip. Electronically signed by RODO Willams on 3/16/2023 at 10:33 AM    Addendum: divya followed up with suzy about pt returning. They have all they need. Divya set up [facility] for 3;30pm-4pm. Divya updated Nursing.   Electronically signed by RODO Willams on 3/16/2023 at 2:43 PM

## 2023-03-16 NOTE — PROGRESS NOTES
Department of Orthopedic Surgery  Resident Progress Note    Patient seen and examined. Pain is well controlled, POD #1 from left foot Lisfranc ORIF and percutaneous pinning. He is very talkative this morning, states he has had a lot going on at home and has been coping by using drugs. No other complaints today. States he would like to go home, says he has court tomorrow    VITALS:  /80   Pulse 68   Temp 97.1 °F (36.2 °C) (Temporal)   Resp 18   Ht 5' 11\" (1.803 m)   Wt 150 lb (68 kg)   SpO2 97%   BMI 20.92 kg/m²     General: alert and oriented to person, place and time, well-developed and well-nourished, in no acute distress    MUSCULOSKELETAL:   left lower extremity:  Dressing C/D/I  Compartments soft and compressible where able to assess around the splint  To plantarflex and dorsiflex all toes  Brisk Cap refill, Toes warm and perfused  Distal sensation grossly intact to Peroneals, Sural, Saphenous, and tibial nrs    CBC:   Lab Results   Component Value Date/Time    WBC 11.3 03/14/2023 07:32 AM    HGB 15.4 03/14/2023 07:32 AM    HCT 45.7 03/14/2023 07:32 AM     03/14/2023 07:32 AM     PT/INR:    Lab Results   Component Value Date/Time    PROTIME 14.5 03/14/2023 07:32 AM    INR 1.3 03/14/2023 07:32 AM         ASSESSMENT  S/P ORIF with percutaneous pinning of left Lisfranc injury on 3/15    PLAN      Continue physical therapy and protocol: NWB -left LE  24 hour abx coverage  Deep venous thrombosis prophylaxis -ASA, early mobilization  PT/OT  Pain Control: IV and PO  Monitor H&H, morning labs pending  D/C Plan: An orthopedic standpoint, the patient is okay for discharge when medically stable.   He will need to follow-up in the office in the next week to pull the percutaneous pin from left foot, patient states he understands and will follow up    Gagan Schneider DO PGY 2  3/16/23    Orthopaedic Attending    I have seen and evaluated the patient with the resident and agree with the above assessments on today's visit. I have performed the key components of the history and physical examination and concur completely with the findings and plans as documented above.     Electronically signed by   Maico Pina DO  3/16/2023

## 2023-03-16 NOTE — DISCHARGE INSTR - COC
Continuity of Care Form    Patient Name: Verónica Pina   :  1980  MRN:  [id number]    Admit date:  3/13/2023  Discharge date:  3/16/23    Code Status Order: Full Code   Advance Directives:   [facility] Documentation       Date/Time Healthcare Directive Type of Healthcare Directive Copy in [address] [facility] [address] Agent's Name Healthcare Agent's Phone Number    [phone] No, patient does not have an advance directive for healthcare treatment -- -- -- -- --            Admitting Physician:  Dave Champagne MD  PCP: No primary care provider on file. Discharging Nurse: SHA SALCEDO [facility] Unit/Room#: 5415/5415-B  Discharging Unit Phone Number: [phone]      Emergency Contact:   Extended Emergency Contact Information  Primary Emergency Contact: barron hollis  Mobile Phone: [phone]  Relation: Parent  Preferred language: English   needed?  No    Past Surgical History:  Past Surgical History:   Procedure Laterality Date    ANKLE FRACTURE SURGERY Left 3/15/2023    OPEN REDUCTION INTERNAL FIXATION LEFT FOOT FIRST TMT JOINT, SECOND TMT JOINT, THIRD TMT JOINT, 2 SCREWS 1 PIN performed by Orly Redd DO at [facility] OR       Immunization History:   Immunization History   Administered Date(s) Administered    COVID-19, PFIZER GRAY top, DO NOT Dilute, (age 15 y+), IM, 30 mcg/0.3 mL 2022    COVID-19, PFIZER PURPLE top, DILUTE for use, (age 15 y+), 30mcg/0.3mL 2021, 2021       Active Problems:  Patient Active Problem List   Diagnosis Code    Lisfranc dislocation, left, initial encounter S93.325A    Closed displaced fracture of distal phalanx of lesser toe of left foot, initial encounter S92.532A       Isolation/Infection:   Isolation            No Isolation          Patient Infection Status       None to display            Nurse Assessment:  Last Vital Signs: /84   Pulse 75   Temp 98.1 °F (36.7 °C) (Oral)   Resp 18   Ht 5' 11\" (1.803 m) Wt 150 lb (68 kg)   SpO2 96%   BMI 20.92 kg/m²     Last documented pain score (0-10 scale): Pain Level: 8  Last Weight:   Wt Readings from Last 1 Encounters:   03/13/23 150 lb (68 kg)     Mental Status:  oriented and alert    IV Access:  - None    Nursing Mobility/ADLs:  Walking   Independent  Transfer  Independent  Bathing  Independent  Dressing  Independent  Toileting  Independent  Feeding  Independent  Med Admin  Assisted  Med Delivery   whole    Wound Care Documentation and Therapy:  Wound 03/14/23 Pretibial Left (Active)   Number of days: 1       Incision 03/15/23 Foot Anterior; Left (Active)   Dressing Status Clean;Dry; Intact 03/15/23 2100   Incision Cleansed Cleansed with saline 03/15/23 1735   Dressing/Treatment Dry dressing;Cast padding; Ace wrap 03/15/23 1845   Closure Sutures; Adhesive bandage 03/15/23 1735   Drainage Amount None 03/15/23 1845   Odor None 03/15/23 1845   Number of days: 0        Elimination:  Continence: Bowel: Yes  Bladder: Yes  Urinary Catheter: None   Colostomy/Ileostomy/Ileal Conduit: No       Date of Last BM: 3/15/23      Intake/Output Summary (Last 24 hours) at 3/16/2023 1338  Last data filed at 3/15/2023 2119  Gross per 24 hour   Intake 460 ml   Output 300 ml   Net 160 ml     I/O last 3 completed shifts: In: 26 [P.O.:460]  Out: 300 [Urine:300]    Safety Concerns: At Risk for Falls    Impairments/Disabilities:      None    Nutrition Therapy:  Current Nutrition Therapy:   - Oral Diet:  General    Routes of Feeding: Oral  Liquids: Thin Liquids  Daily Fluid Restriction: no  Last Modified Barium Swallow with Video (Video Swallowing Test): not done    Treatments at the Time of Hospital Discharge:   Respiratory Treatments: na  Oxygen Therapy:  is not on home oxygen therapy.   Ventilator:    - No ventilator support    Rehab Therapies: Physical Therapy and Occupational Therapy  Weight Bearing Status/Restrictions: Non-weight bearing on left leg  Other Medical Equipment (for information only, NOT a DME order):  wheeled walker  Other Treatments: na    Patient's personal belongings (please select all that are sent with patient):  None    RN SIGNATURE:  Electronically signed by Warden Mady RN on 3/16/23 at 2:38 PM EDT    CASE MANAGEMENT/SOCIAL WORK SECTION    Inpatient Status Date: ***    Readmission Risk Assessment Score:  Readmission Risk              Risk of Unplanned Readmission:  11           Discharging to Facility/ Agency   Name:   Address:  Phone:  Fax:    Dialysis Facility (if applicable)   Name:  Address:  Dialysis Schedule:  Phone:  Fax:    / signature: {Esignature:[id number]}    PHYSICIAN SECTION    Prognosis: {Prognosis:3947663190}    Condition at Discharge: Dana Jones Patient Condition:[id number]}    Rehab Potential (if transferring to Rehab): {Prognosis:8955163801}    Recommended Labs or Other Treatments After Discharge: ***    Physician Certification: I certify the above information and transfer of Analy Davis  is necessary for the continuing treatment of the diagnosis listed and that he requires {Admit to Appropriate Level of Care:05690} for {GREATER/LESS:199874684} 30 days.      Update Admission H&P: {CHP DME Changes in ORPNU:761519091}    PHYSICIAN SIGNATURE:  {Esignature:[id number]}

## 2023-03-16 NOTE — DISCHARGE SUMMARY
[facility]    Hospitalist Discharge Summary        Patient: Bryce Arizmendi  YOB: 1980  MRN: [id number]   Acct: [de-identified]    Primary Care Physician: No primary care provider on file. Admit date  3/13/2023    Discharge date:  3/16/2023 1:45 PM    Chief Complaint on presentation :-  ***    Discharge Assessment and Plan:-   ***    Initial H and P and Hospital course:-  ***    Physical Exam:-  Vitals:   Patient Vitals for the past 24 hrs:   BP Temp Temp src Pulse Resp SpO2   03/16/23 0822 115/84 98.1 °F (36.7 °C) Oral 75 18 96 %   03/15/23 2108 116/80 97.1 °F (36.2 °C) Temporal 68 18 97 %   03/15/23 1855 (!) 159/99 97.7 °F (36.5 °C) Temporal 83 16 93 %   03/15/23 1845 (!) 168/99 -- -- 91 17 92 %   03/15/23 1830 (!) 167/99 -- -- 96 13 92 %   03/15/23 1815 (!) 163/101 -- -- 93 16 96 %   03/15/23 1800 (!) 203/147 -- -- 97 16 100 %   03/15/23 1758 (!) 180/125 97.6 °F (36.4 °C) -- 94 15 100 %   03/15/23 1755 (!) 180/73 97.6 °F (36.4 °C) Temporal 92 22 100 %     Weight:   Weight: 150 lb (68 kg)   24 hour intake/output:     Intake/Output Summary (Last 24 hours) at 3/16/2023 1345  Last data filed at 3/15/2023 2119  Gross per 24 hour   Intake 460 ml   Output 300 ml   Net 160 ml         General appearance: No apparent distress, appears stated age and cooperative. HEENT:  Conjunctivae/corneas clear. Neck: Supple. No jugular venous distention. Respiratory: Clear to auscultation bilaterally, normal respiratory effort  Cardiovascular: Regular rate rhythm, normal S1-S2  Abdomen: Soft, nontender, nondistended  Musculoskeletal: No clubbing, cyanosis, no bilateral lower extremity edema.    Skin:  No rashes  on visible skin  Neurologic: awake, alert and following commands          Discharge Medications:-      Medication List        START taking these medications      * aspirin 325 MG EC tablet  Take 1 tablet by mouth 2 times daily for 28 days     * aspirin 325 MG EC tablet  Take 1 tablet by mouth 2 times daily for 10 days     oxyCODONE HCl 10 MG immediate release tablet  Commonly known as: OXY-IR  Take 1 tablet by mouth every 6 hours as needed for Pain for up to 3 days. Max Daily Amount: 40 mg  Replaces: oxyCODONE 5 MG capsule           * This list has 2 medication(s) that are the same as other medications prescribed for you. Read the directions carefully, and ask your doctor or other care provider to review them with you.                 CONTINUE taking these medications      buprenorphine-naloxone 8-2 MG Subl SL tablet  Commonly known as: SUBOXONE     hydrOXYzine HCl 10 MG tablet  Commonly known as: ATARAX     mirtazapine 30 MG tablet  Commonly known as: REMERON            STOP taking these medications      chlorhexidine 0.12 % solution  Commonly known as: PERIDEX     ciprofloxacin 500 MG tablet  Commonly known as: CIPRO     clindamycin 150 MG capsule  Commonly known as: CLEOCIN     levoFLOXacin 750 MG tablet  Commonly known as: LEVAQUIN     oxyCODONE 5 MG capsule  Replaced by: oxyCODONE HCl 10 MG immediate release tablet     predniSONE 10 MG tablet  Commonly known as: DELTASONE     QUEtiapine 25 MG tablet  Commonly known as: SEROQUEL     sodium bicarbonate 325 MG tablet               Where to Get Your Medications        These medications were sent to [facility], [address] [address]      Phone: [phone]   aspirin 325 MG EC tablet       You can get these medications from any pharmacy    Bring a paper prescription for each of these medications  oxyCODONE HCl 10 MG immediate release tablet       Information about where to get these medications is not yet available    Ask your nurse or doctor about these medications  aspirin 325 MG EC tablet          Labs :-  Recent Results (from the past 72 hour(s))   CBC with Auto Differential    Collection Time: 03/14/23  7:32 AM   Result Value Ref Range    WBC 11.3 4.5 - 11.5 E9/L    RBC 4.90 3.80 - 5.80 E12/L    Hemoglobin 15.4 12.5 - 16.5 g/dL    Hematocrit 45.7 37.0 - 54.0 %    MCV 93.3 80.0 - 99.9 fL    MCH 31.4 26.0 - 35.0 pg    MCHC 33.7 32.0 - 34.5 %    RDW 12.9 11.5 - 15.0 fL    Platelets 828 080 - 849 E9/L    MPV 9.6 7.0 - 12.0 fL    Neutrophils % 75.2 43.0 - 80.0 %    Immature Granulocytes % 0.4 0.0 - 5.0 %    Lymphocytes % 15.6 (L) 20.0 - 42.0 %    Monocytes % 8.3 2.0 - 12.0 %    Eosinophils % 0.2 0.0 - 6.0 %    Basophils % 0.3 0.0 - 2.0 %    Neutrophils Absolute 8.48 (H) 1.80 - 7.30 E9/L    Immature Granulocytes # 0.04 E9/L    Lymphocytes Absolute 1.75 1.50 - 4.00 E9/L    Monocytes Absolute 0.93 0.10 - 0.95 E9/L    Eosinophils Absolute 0.02 (L) 0.05 - 0.50 E9/L    Basophils Absolute 0.03 0.00 - 0.20 E9/L   BMP    Collection Time: 03/14/23  7:32 AM   Result Value Ref Range    Sodium 139 132 - 146 mmol/L    Potassium 3.4 (L) 3.5 - 5.0 mmol/L    Chloride 98 98 - 107 mmol/L    CO2 27 22 - 29 mmol/L    Anion Gap 14 7 - 16 mmol/L    Glucose 98 74 - 99 mg/dL    BUN 10 6 - 20 mg/dL    Creatinine 0.9 0.7 - 1.2 mg/dL    Est, Glom Filt Rate >60 >=60 mL/min/1.73    Calcium 9.8 8.6 - 10.2 mg/dL   Protime-INR    Collection Time: 03/14/23  7:32 AM   Result Value Ref Range    Protime 14.5 (H) 9.3 - 12.4 sec    INR 1.3    TYPE AND SCREEN    Collection Time: 03/14/23  7:32 AM   Result Value Ref Range    ABO/Rh B POS     Antibody Screen POS    ANTIBODY IDENTIFICATION    Collection Time: 03/14/23  7:32 AM   Result Value Ref Range    Antibody ID POS, Anti-Donna     Antibody ID POS, Anti-I    Doctor Notification    Collection Time: 03/14/23  7:32 AM   Result Value Ref Range    DR. MADRID COMPL    PREPARE RBC (CROSSMATCH)    Collection Time: 03/14/23  7:32 AM   Result Value Ref Range    Product Code Blood Bank [id number]     Description Blood Bank Red Blood Cells, Leuko-reduced     Unit Number [id number]     Dispense Status Blood Bank selected    EKG 12 Lead    Collection Time: 03/14/23  8:07 AM Result Value Ref Range    Ventricular Rate 68 BPM    Atrial Rate 68 BPM    P-R Interval 156 ms    QRS Duration 82 ms    Q-T Interval 438 ms    QTc Calculation (Bazett) 465 ms    P Axis 78 degrees    R Axis 23 degrees    T Axis 65 degrees   SERUM DRUG SCREEN    Collection Time: 03/14/23 10:55 AM   Result Value Ref Range    Ethanol Lvl <10 mg/dL    Acetaminophen Level <5.0 (L) 10.0 - 13.3 mcg/mL    Salicylate, Serum <4.1 0.0 - 30.0 mg/dL    TCA Scrn NEGATIVE Cutoff:300 ng/mL   URINE DRUG SCREEN    Collection Time: 03/14/23  1:04 PM   Result Value Ref Range    Amphetamine Screen, Urine POSITIVE (A) Negative <1000 ng/mL    Barbiturate Screen, Ur NOT DETECTED Negative < 200 ng/mL    Benzodiazepine Screen, Urine NOT DETECTED Negative < 200 ng/mL    Cannabinoid Scrn, Ur POSITIVE (A) Negative < 50ng/mL    Cocaine Metabolite Screen, Urine POSITIVE (A) Negative < 300 ng/mL    Opiate Scrn, Ur NOT DETECTED Negative < 300ng/mL    PCP Screen, Urine POSITIVE (A) Negative < 25 ng/mL    Methadone Screen, Urine NOT DETECTED Negative <300 ng/mL    Oxycodone Urine NOT DETECTED Negative <100 ng/mL    FENTANYL SCREEN, URINE POSITIVE (A) Negative <1 ng/mL    Drug Screen Comment: see below    Basic Metabolic Panel    Collection Time: 03/15/23 10:11 AM   Result Value Ref Range    Sodium 139 132 - 146 mmol/L    Potassium 3.1 (L) 3.5 - 5.0 mmol/L    Chloride 99 98 - 107 mmol/L    CO2 30 (H) 22 - 29 mmol/L    Anion Gap 10 7 - 16 mmol/L    Glucose 100 (H) 74 - 99 mg/dL    BUN 7 6 - 20 mg/dL    Creatinine 0.9 0.7 - 1.2 mg/dL    Est, Glom Filt Rate >60 >=60 mL/min/1.73    Calcium 9.2 8.6 - 10.2 mg/dL   Renal Function Panel    Collection Time: 03/16/23 10:49 AM   Result Value Ref Range    Sodium 140 132 - 146 mmol/L    Potassium 3.5 3.5 - 5.0 mmol/L    Chloride 101 98 - 107 mmol/L    CO2 28 22 - 29 mmol/L    Anion Gap 11 7 - 16 mmol/L    Glucose 157 (H) 74 - 99 mg/dL    BUN 10 6 - 20 mg/dL    Creatinine 0.9 0.7 - 1.2 mg/dL    Est, Glom Filt Rate >60 >=60 mL/min/1.73    Calcium 9.5 8.6 - 10.2 mg/dL    Phosphorus 2.0 (L) 2.5 - 4.5 mg/dL    Albumin 3.8 3.5 - 5.2 g/dL   CK    Collection Time: 03/16/23 10:49 AM   Result Value Ref Range    Total  (H) 20 - 200 U/L   Magnesium    Collection Time: 03/16/23 10:49 AM   Result Value Ref Range    Magnesium 1.8 1.6 - 2.6 mg/dL   COVID-19, Rapid    Collection Time: 03/16/23 11:35 AM    Specimen: Nasopharyngeal Swab; Nasal swab   Result Value Ref Range    SARS-CoV-2, NAAT Not Detected Not Detected        Microbiology:    Blood culture #1: No results found for: BC    Blood culture #2:No results found for: BLOODCULT2    Organism:  No results found for: LABGRAM    MRSA culture only:No results found for: [address]    Urine culture: No results found for: LABURIN  No results found for: ORG     Respiratory culture: No results found for: CULTRESP    Aerobic and Anaerobic :  No results found for: LABAERO  No results found for: LABANAE    Urinalysis:    No results found for: Imani Mocha, BACTERIA, RBCUA, BLOODU, SPECGRAV, [address]    Radiology:-  XR FOOT LEFT (MIN 3 VIEWS)    Result Date: 3/14/2023  EXAMINATION: THREE XRAY VIEWS OF THE LEFT FOOT 3/14/2023 5:00 am COMPARISON: None. HISTORY: ORDERING SYSTEM PROVIDED HISTORY: foot pain TECHNOLOGIST PROVIDED HISTORY: Reason for exam:->foot pain What reading provider will be dictating this exam?->CRC FINDINGS: Moderately displaced midfoot fracture dislocation of the 2nd through 5th digits with mild dorsal lateral subluxation and complete rupture of the Lisfranc ligament. Mild circumferential soft tissue edema about the midfoot. Moderately displaced midfoot fracture dislocation of the 2nd through 5th digits with mild dorsal lateral subluxation and complete rupture of the Lisfranc ligament. RECOMMENDATION: Careful clinical correlation and follow up recommended.      CT FOOT LEFT WO CONTRAST    Result Date: 3/14/2023  EXAMINATION: CT OF THE LEFT FOOT WITHOUT CONTRAST 3/14/2023 12:01 pm TECHNIQUE: CT of the left foot was performed without the administration of intravenous contrast.  Multiplanar reformatted images are provided for review. Automated exposure control, iterative reconstruction, and/or weight based adjustment of the mA/kV was utilized to reduce the radiation dose to as low as reasonably achievable. COMPARISON: None. HISTORY ORDERING SYSTEM PROVIDED HISTORY: Pre op planning TECHNOLOGIST PROVIDED HISTORY: Reason for exam:->Pre op planning What reading provider will be dictating this exam?->CRC FINDINGS: Bones: There is a type a Lisfranc injury, homolateral with apparent complete incongruity at the metatarsotarsal joints. Mild dorsal subluxation of the metatarsals at the metatarsotarsal joints. Small avulsion fractures are noted arising from the medial base of the 1st metatarsal, base of the 2nd through 5th metatarsals. Cuboid bone appears intact. No displaced fractures of cuneiform bones. The calcaneus and talus bones appear intact. Distal tibia and fibula appear normal.  The phalanges appear intact. Soft Tissue: No significant soft tissue edema or fluid collections. 1.  Lisfranc injury with complete lateral incongruity, homolateral type (type A). Small associated avulsion fractures from the proximal 1st through 5th metatarsals. Follow-up scheduled after discharge :-    {follow up:91578} with No primary care provider on file.   {follow up:14921} ***    Consultations during this hospital stay:-  [] NONE [] Cardiology  [] Nephrology  [] Hemo onco   [] GI   [] ID  [] Endocrine  [] Pulm    [] Neuro    [] Psych   [] Urology  [] ENT   [] G SURGERY   []Ortho    []CV surg    [] Palliative  [] Hospice [] Pain management   []    []TCU   [] PT/OT  OTHERS:-    Disposition: {disposition:01832}  Condition at Discharge: {STABLE/UNSTABLE:642422233}    Time Spent:- {NUMBERS BY 5:89261} minutes    Electronically signed by Sj Rosenthal MD on 3/16/2023 at 1:45 PM  Discharging Hospitalist

## 2023-03-17 LAB
BLOOD BANK DISPENSE STATUS: NORMAL
BLOOD BANK PRODUCT CODE: NORMAL
BPU ID: NORMAL
DESCRIPTION BLOOD BANK: NORMAL

## 2023-03-31 DIAGNOSIS — S93.325A LISFRANC DISLOCATION, LEFT, INITIAL ENCOUNTER: Primary | ICD-10-CM

## 2023-04-03 PROBLEM — M79.673 FOOT PAIN: Status: ACTIVE | Noted: 2023-04-03

## 2023-04-03 RX ORDER — CHLORHEXIDINE GLUCONATE ORAL RINSE 1.2 MG/ML
15 SOLUTION DENTAL 2 TIMES DAILY
COMMUNITY
Start: 2022-07-15

## 2023-04-03 RX ORDER — LEVOFLOXACIN 750 MG/1
250 TABLET ORAL EVERY OTHER DAY
COMMUNITY
Start: 2022-01-21 | End: 2023-10-13

## 2023-04-03 RX ORDER — RISPERIDONE 2 MG/1
2 TABLET ORAL DAILY
COMMUNITY
Start: 2023-03-21

## 2023-04-03 RX ORDER — CLINDAMYCIN HYDROCHLORIDE 150 MG/1
CAPSULE ORAL
COMMUNITY
Start: 2022-07-15 | End: 2024-02-05

## 2023-04-03 RX ORDER — METRONIDAZOLE 500 MG/1
500 TABLET ORAL EVERY 8 HOURS
COMMUNITY
Start: 2022-01-21

## 2023-04-03 RX ORDER — CIPROFLOXACIN 500 MG/1
1 TABLET ORAL 2 TIMES DAILY
COMMUNITY
Start: 2022-04-26 | End: 2023-10-13

## 2023-04-04 ENCOUNTER — OFFICE VISIT (OUTPATIENT)
Dept: PRIMARY CARE | Facility: CLINIC | Age: 43
End: 2023-04-04
Payer: COMMERCIAL

## 2023-04-04 VITALS
BODY MASS INDEX: 20.16 KG/M2 | OXYGEN SATURATION: 94 % | HEART RATE: 90 BPM | SYSTOLIC BLOOD PRESSURE: 108 MMHG | RESPIRATION RATE: 18 BRPM | DIASTOLIC BLOOD PRESSURE: 74 MMHG | TEMPERATURE: 97.8 F | HEIGHT: 71 IN | WEIGHT: 144 LBS

## 2023-04-04 DIAGNOSIS — F11.20: ICD-10-CM

## 2023-04-04 DIAGNOSIS — S92.902A UNSPECIFIED FRACTURE OF LEFT FOOT, INITIAL ENCOUNTER FOR CLOSED FRACTURE: Primary | ICD-10-CM

## 2023-04-04 PROCEDURE — 99214 OFFICE O/P EST MOD 30 MIN: CPT | Performed by: INTERNAL MEDICINE

## 2023-04-04 RX ORDER — ASPIRIN 325 MG
325 TABLET ORAL DAILY
COMMUNITY
Start: 2023-03-15 | End: 2023-04-12

## 2023-04-04 RX ORDER — TRAZODONE HYDROCHLORIDE 50 MG/1
50 TABLET ORAL NIGHTLY
COMMUNITY
Start: 2013-10-29

## 2023-04-04 RX ORDER — OXYCODONE HYDROCHLORIDE 10 MG/1
10 TABLET ORAL EVERY 6 HOURS PRN
COMMUNITY
Start: 2023-03-23

## 2023-04-04 SDOH — ECONOMIC STABILITY: FOOD INSECURITY: WITHIN THE PAST 12 MONTHS, YOU WORRIED THAT YOUR FOOD WOULD RUN OUT BEFORE YOU GOT MONEY TO BUY MORE.: OFTEN TRUE

## 2023-04-04 SDOH — ECONOMIC STABILITY: FOOD INSECURITY: WITHIN THE PAST 12 MONTHS, THE FOOD YOU BOUGHT JUST DIDN'T LAST AND YOU DIDN'T HAVE MONEY TO GET MORE.: OFTEN TRUE

## 2023-04-04 ASSESSMENT — LIFESTYLE VARIABLES
AUDIT-C TOTAL SCORE: 0
HOW OFTEN DO YOU HAVE A DRINK CONTAINING ALCOHOL: NEVER
SKIP TO QUESTIONS 9-10: 1
HOW MANY STANDARD DRINKS CONTAINING ALCOHOL DO YOU HAVE ON A TYPICAL DAY: PATIENT DOES NOT DRINK
HOW OFTEN DO YOU HAVE SIX OR MORE DRINKS ON ONE OCCASION: NEVER

## 2023-04-04 ASSESSMENT — ENCOUNTER SYMPTOMS
LOSS OF SENSATION IN FEET: 1
DEPRESSION: 1
OCCASIONAL FEELINGS OF UNSTEADINESS: 1

## 2023-04-04 NOTE — PROGRESS NOTES
Subjective   Patient ID: Guanaco Cardenas is a 42 y.o. male who presents for Hospital Follow-up (Broke leg, had surgery at [facility]) and Pain (Severe pain.).    HPI     Opioid abuse: He takes Suboxone through [facility]     sleep issues: patient takes hydroxyzine, Mirtazepine, Quetiapine. He also takes buspirone.     rash on face: patient has developed rash on his face, a bump on the right thigh. The lesions have not drained, they hard patient states.     Review of Systems    Objective   There were no vitals taken for this visit.    Physical Exam    Assessment/Plan   {Assess/PlanSmartLinks:36073}

## 2023-04-04 NOTE — PROGRESS NOTES
"Chief Complaint/HPI:    Patient recently broke his foot, he continues to have severe pain in he left foot. Patient has a history of opoid addiction. He requests a chair  to keep the foot elevated or a roller device to keep the foot elevated  when he ambulates.  He was apparently in [facility] Was hospitalized for mental health issues he states. Patient would like to get a chair to keep the LUE elevated if possible.    Reviewed ER visits, the patient did test positive for amphetamines, fentanyl , PCP, cannabis on UDS completed3/12/2023    ROS otherwise negative aside from what was mentioned above in HPI.      Patient Active Problem List   Diagnosis    Abscess    Blood pressure elevated without history of HTN    Chronic back pain    Chronic post concussive encephalopathy    Dental abscess    Depression    Elevated liver enzymes    Fentanyl use disorder, moderate (CMS/HCC)    Flank pain, acute    Focal segmental glomerulosclerosis in remission    Generalized pain    Genital warts    Hematospermia    Insomnia    Kidney pain    Leukocytosis    Moderate tobacco use disorder    Perineal abscess    PTSD (post-traumatic stress disorder)    Foot pain    Unspecified fracture of left foot, initial encounter for closed fracture         Past Medical History:   Diagnosis Date    Cellulitis and abscess of mouth 08/27/2018    Abscess of soft tissue of mouth    Cutaneous abscess of face     Facial abscess    Dental caries, unspecified 03/13/2018    Pain due to dental caries    Furuncle of buttock     Boil, buttock    Opioid abuse, uncomplicated (CMS/HCC)     Narcotic abuse    Personal history of other (healed) physical injury and trauma 01/23/2020    History of back injury    Personal history of other diseases of the musculoskeletal system and connective tissue 01/23/2020    History of low back pain    Rectal abscess 10/15/2019    Perirectal abscess     Past Surgical History:   Procedure Laterality Date    OTHER SURGICAL HISTORY  " 10/15/2019    Tooth extraction     Social History     Social History Narrative    Not on file         ALLERGIES  Amoxicillin and Penicillins      MEDICATIONS  Current Outpatient Medications on File Prior to Visit   Medication Sig Dispense Refill    chlorhexidine (Peridex) 0.12 % solution 15 mL  in the morning and 15 mL in the evening.      clindamycin (Cleocin) 150 mg capsule TAKE 3 CAPSULES BY MOUTH 3 TIMES DAILY FOR 7 DAYS FOR SKIN INFECTION      hydrOXYzine HCL (Atarax) 25 mg tablet Take 1 tablet (25 mg) by mouth once daily at bedtime.      mirtazapine (Remeron) 45 mg tablet Take 1 tablet (45 mg) by mouth once daily at bedtime.      oxyCODONE (Roxicodone) 10 mg immediate release tablet Take 1 tablet (10 mg) by mouth every 6 hours if needed.      QUEtiapine (SEROquel) 50 mg tablet Take 1 tablet (50 mg) by mouth once daily at bedtime.      risperiDONE (RisperDAL) 2 mg tablet Take 1 tablet (2 mg) by mouth once daily.      traZODone (Desyrel) 50 mg tablet Take 1 tablet (50 mg) by mouth once daily at bedtime.      aspirin 325 mg tablet Take 1 tablet (325 mg) by mouth once daily.      buprenorphine-naloxone (Suboxone) 8-2 mg SL film Suboxone 8-2 MG Sublingual Film   Refills: 0       Active      busPIRone (Buspar) 30 mg tablet Take 1 tablet (30 mg) by mouth in the morning and 1 tablet (30 mg) in the evening and 1 tablet (30 mg) before bedtime.      ciprofloxacin (Cipro) 500 mg tablet Take 1 tablet (500 mg) by mouth in the morning and 1 tablet (500 mg) before bedtime.      levoFLOXacin (Levaquin) 750 mg tablet Take 250 mg by mouth every other day.      metroNIDAZOLE (Flagyl) 500 mg tablet Take 1 tablet (500 mg) by mouth in the morning and 1 tablet (500 mg) at noon and 1 tablet (500 mg) before bedtime.       No current facility-administered medications on file prior to visit.         PHYSICAL EXAM  /74 (BP Location: Right arm, Patient Position: Sitting, BP Cuff Size: Adult)   Pulse 90   Temp 36.6 °C (97.8 °F)    "Resp 18   Ht 1.803 m (5' 11\")   Wt 65.3 kg (144 lb)   SpO2 94%   BMI 20.08 kg/m²   Body mass index is 20.08 kg/m².  Gen: Alert, NAD, wearing a mask  He is sitting in a wheelchair, left foot is in a walking shoe at the present time, he has a splint in place also, the foot appears to be swelled  HEENT:  EOMI, conjunctiva and sclera normal in appearance  Respiratory:  Lungs CTAB  Cardiovascular:  Heart RRR. No M/R/G  Neuro:  Gross motor and sensory intact  Skin:  No suspicious lesions present    ASSESSMENT/PLAN  Problem List Items Addressed This Visit          Musculoskeletal    Unspecified fracture of left foot, initial encounter for closed fracture - Primary    Current Assessment & Plan     Will refer to orthopedics for evaluation, will refer to PT for assessment in prescription of assistive device for the patient. Will order a wheelchair with elevated foot for use now. Follow up as scheduled.              Other    Fentanyl use disorder, moderate (CMS/HCC)    Current Assessment & Plan     No meds are ordered, patient needs to follow up with provider for continued Suboxone therapy              Carlos Manuel Schreiber MD   "

## 2023-04-04 NOTE — PROGRESS NOTES
Subjective   Patient ID: Guanaco Cardenas is a 42 y.o. male who presents for No chief complaint on file..    HPI     Opioid abuse: He takes Suboxone through [facility]     sleep issues: patient takes hydroxyzine, Mirtazepine, Quetiapine. He also takes buspirone.     rash on face: patient has developed rash on his face, a bump on the right thigh. The lesions have not drained, they hard patient states.     Review of Systems    Objective   There were no vitals taken for this visit.    Physical Exam    Assessment/Plan   {Assess/PlanSmartLinks:48382}

## 2023-04-04 NOTE — ASSESSMENT & PLAN NOTE
Will refer to orthopedics for evaluation, will refer to PT for assessment in prescription of assistive device for the patient. Will order a wheelchair with elevated foot for use now. Follow up as scheduled.

## 2023-05-04 ENCOUNTER — TELEPHONE (OUTPATIENT)
Dept: ORTHOPEDIC SURGERY | Age: 43
End: 2023-05-04

## 2023-05-04 NOTE — TELEPHONE ENCOUNTER
Call from pt requesting appt to remove pin, stating never seen since sx. Per po appt 4/3/23 pt missed appt, appt canceled. No contact info available. Updated contact info Ph#  s/w w/ WILBERT YODER & Courtney PACHECO advised to schedule pt today or tomorrow. Advised pt he is going to contact provide a ride to get transport and will call back.

## 2023-05-08 ENCOUNTER — HOSPITAL ENCOUNTER (OUTPATIENT)
Dept: GENERAL RADIOLOGY | Age: 43
Discharge: HOME | End: 2023-05-10
Payer: COMMERCIAL

## 2023-05-08 ENCOUNTER — OFFICE VISIT (OUTPATIENT)
Dept: ORTHOPEDIC SURGERY | Age: 43
End: 2023-05-08
Payer: COMMERCIAL

## 2023-05-08 VITALS — WEIGHT: 150 LBS | BODY MASS INDEX: 21 KG/M2 | HEIGHT: 71 IN

## 2023-05-08 DIAGNOSIS — S93.325A LISFRANC DISLOCATION, LEFT, INITIAL ENCOUNTER: ICD-10-CM

## 2023-05-08 DIAGNOSIS — S92.532A CLOSED DISPLACED FRACTURE OF DISTAL PHALANX OF LESSER TOE OF LEFT FOOT, INITIAL ENCOUNTER: Primary | ICD-10-CM

## 2023-05-08 PROCEDURE — 99213 OFFICE O/P EST LOW 20 MIN: CPT

## 2023-05-08 PROCEDURE — 20670 REMOVAL IMPLANT SUPERFICIAL: CPT | Performed by: PHYSICIAN ASSISTANT

## 2023-05-08 PROCEDURE — L4361 PNEUMA/VAC WALK BOOT PRE OTS: HCPCS

## 2023-05-08 PROCEDURE — 73630 X-RAY EXAM OF FOOT: CPT

## 2023-05-08 NOTE — PATIENT INSTRUCTIONS
Okay to partial weight-bear left lower extremity through the heel in the hard soled shoe. Try to avoid weightbearing on the left leg is much as possible. Okay to remove the hard soled shoe for hygiene, gentle range of motion exercises of the ankle/toes and when sitting or lying around the house. Patient will likely start PT after his next office visit. Recommend aspirin 81 mg twice daily for DVT prophylaxis. Follow-up in 4 weeks for reevaluation, x-rays and possible progression of weightbearing status. Patient lives in [facility] and this will probably be his final follow-up visit in our office. His insurance requires him to follow-up with the surgeon that did his surgery for 3 months postop. He will likely be transitioned to an orthopedic surgeon closer to where he lives. Call if any questions or concerns.

## 2023-05-08 NOTE — PROGRESS NOTES
weightbearing status. Patient lives in [facility] and this will probably be his final follow-up visit in our office. His insurance requires him to follow-up with the surgeon that did his surgery for 3 months postop. He will likely be transitioned to an orthopedic surgeon closer to where he lives. Call if any questions or concerns. Electronically signed by WILBERT Mendez on 5/8/2023 at 2:29 PM  Note: This report was completed using [employer] voiced recognition software. Every effort has been made to ensure accuracy; however, inadvertent computerized transcription errors may be present.

## 2023-06-09 DIAGNOSIS — S93.325A LISFRANC DISLOCATION, LEFT, INITIAL ENCOUNTER: ICD-10-CM

## 2023-06-09 DIAGNOSIS — S92.532A CLOSED DISPLACED FRACTURE OF DISTAL PHALANX OF LESSER TOE OF LEFT FOOT, INITIAL ENCOUNTER: Primary | ICD-10-CM

## 2023-10-11 ENCOUNTER — TELEPHONE (OUTPATIENT)
Dept: PRIMARY CARE | Facility: CLINIC | Age: 43
End: 2023-10-11
Payer: COMMERCIAL

## 2023-10-11 DIAGNOSIS — F43.10 PTSD (POST-TRAUMATIC STRESS DISORDER): Primary | ICD-10-CM

## 2023-10-11 DIAGNOSIS — F11.20: ICD-10-CM

## 2023-10-11 DIAGNOSIS — F07.81 CHRONIC POST CONCUSSIVE ENCEPHALOPATHY: ICD-10-CM

## 2023-10-11 DIAGNOSIS — F32.A DEPRESSION, UNSPECIFIED DEPRESSION TYPE: ICD-10-CM

## 2023-10-11 NOTE — TELEPHONE ENCOUNTER
PATIENT CALLED IN STATING HE NEEDS HIS MEDS FILLED. DOESN'T LOOK LIKE ANYTHING WAS SENT IN LAST TIME HE WAS IN OFFICE    QUETIAPINE 50MG  MIRTAZAPINE 45MG     PHARMACY: [facility] [place]

## 2023-10-13 DIAGNOSIS — F32.A DEPRESSION, UNSPECIFIED DEPRESSION TYPE: Primary | ICD-10-CM

## 2023-10-13 RX ORDER — QUETIAPINE FUMARATE 50 MG/1
50 TABLET, FILM COATED ORAL NIGHTLY
Qty: 30 TABLET | Refills: 0 | Status: SHIPPED | OUTPATIENT
Start: 2023-10-13 | End: 2023-11-14 | Stop reason: SDUPTHER

## 2023-10-13 RX ORDER — MIRTAZAPINE 45 MG/1
45 TABLET, FILM COATED ORAL NIGHTLY
Qty: 30 TABLET | Refills: 3 | Status: SHIPPED | OUTPATIENT
Start: 2023-10-13

## 2023-10-13 NOTE — TELEPHONE ENCOUNTER
Are you comfortable with filling these. He was last seen 4/4/23 and has an appt on 11/2/23. He was seen in the ER on 8/31/23 for drug induced paranoid delusions with suicidal thoughts. Please advise.

## 2023-10-23 NOTE — TELEPHONE ENCOUNTER
Talked with pt and gave doctors message.  Pt said that he doesn't have a psychiatrist to get medication from now.  If you would like him to have a psychiatrist he will need a referral.  He prefers to continue to get the medication through his PCP.

## 2023-11-02 ENCOUNTER — APPOINTMENT (OUTPATIENT)
Dept: PRIMARY CARE | Facility: CLINIC | Age: 43
End: 2023-11-02
Payer: COMMERCIAL

## 2023-11-13 ENCOUNTER — TELEPHONE (OUTPATIENT)
Dept: PRIMARY CARE | Facility: CLINIC | Age: 43
End: 2023-11-13
Payer: COMMERCIAL

## 2023-11-14 DIAGNOSIS — F43.10 PTSD (POST-TRAUMATIC STRESS DISORDER): ICD-10-CM

## 2023-11-14 RX ORDER — QUETIAPINE FUMARATE 50 MG/1
50 TABLET, FILM COATED ORAL NIGHTLY
Qty: 30 TABLET | Refills: 0 | Status: SHIPPED | OUTPATIENT
Start: 2023-11-14

## 2023-12-06 DIAGNOSIS — F43.10 PTSD (POST-TRAUMATIC STRESS DISORDER): ICD-10-CM

## 2023-12-06 DIAGNOSIS — F32.A DEPRESSION, UNSPECIFIED: ICD-10-CM

## 2023-12-06 PROBLEM — F19.10 POLYSUBSTANCE ABUSE (MULTI): Status: ACTIVE | Noted: 2018-11-22

## 2023-12-06 PROBLEM — G92.9 TOXIC ENCEPHALOPATHY: Status: ACTIVE | Noted: 2018-11-22

## 2023-12-06 PROBLEM — S93.325A LISFRANC DISLOCATION, LEFT, INITIAL ENCOUNTER: Status: ACTIVE | Noted: 2023-03-14

## 2023-12-06 PROBLEM — S92.532A CLOSED DISPLACED FRACTURE OF DISTAL PHALANX OF LESSER TOE OF LEFT FOOT: Status: ACTIVE | Noted: 2023-03-15

## 2023-12-06 RX ORDER — MUPIROCIN CALCIUM 20 MG/G
1 CREAM TOPICAL 3 TIMES DAILY
COMMUNITY
Start: 2023-04-15

## 2023-12-06 RX ORDER — OLANZAPINE 10 MG/1
10 TABLET ORAL NIGHTLY
COMMUNITY
Start: 2023-11-22

## 2023-12-06 RX ORDER — DIPHENHYDRAMINE HCL 25 MG
25 CAPSULE ORAL EVERY 4 HOURS PRN
COMMUNITY
Start: 2019-05-13

## 2023-12-06 RX ORDER — ALBUTEROL SULFATE 90 UG/1
2 AEROSOL, METERED RESPIRATORY (INHALATION) EVERY 4 HOURS PRN
COMMUNITY
Start: 2023-09-26

## 2023-12-06 RX ORDER — IBUPROFEN 200 MG
1 TABLET ORAL
COMMUNITY
Start: 2013-10-29

## 2023-12-06 RX ORDER — HALOPERIDOL 5 MG/ML
5 INJECTION INTRAMUSCULAR EVERY 8 HOURS PRN
COMMUNITY
Start: 2013-10-04

## 2023-12-06 RX ORDER — NAPROXEN SODIUM 220 MG/1
81 TABLET, FILM COATED ORAL
COMMUNITY
Start: 2013-10-03

## 2023-12-07 RX ORDER — QUETIAPINE FUMARATE 50 MG/1
50 TABLET, FILM COATED ORAL NIGHTLY
Qty: 30 TABLET | Refills: 0 | OUTPATIENT
Start: 2023-12-07

## 2023-12-07 RX ORDER — HYDROXYZINE HYDROCHLORIDE 25 MG/1
25 TABLET, FILM COATED ORAL NIGHTLY
Qty: 90 TABLET | Refills: 3 | OUTPATIENT
Start: 2023-12-07

## 2023-12-07 RX ORDER — HYDROXYZINE HYDROCHLORIDE 25 MG/1
25 TABLET, FILM COATED ORAL NIGHTLY
Qty: 90 TABLET | Refills: 1 | Status: SHIPPED | OUTPATIENT
Start: 2023-12-07 | End: 2024-12-06

## 2024-02-05 ENCOUNTER — HOSPITAL ENCOUNTER (EMERGENCY)
Facility: HOSPITAL | Age: 44
Discharge: HOME | End: 2024-02-05
Payer: COMMERCIAL

## 2024-02-05 ENCOUNTER — PHARMACY VISIT (OUTPATIENT)
Dept: PHARMACY | Facility: CLINIC | Age: 44
End: 2024-02-05
Payer: MEDICAID

## 2024-02-05 VITALS
BODY MASS INDEX: 21.76 KG/M2 | WEIGHT: 155.4 LBS | RESPIRATION RATE: 18 BRPM | TEMPERATURE: 98 F | HEART RATE: 79 BPM | HEIGHT: 71 IN | SYSTOLIC BLOOD PRESSURE: 112 MMHG | DIASTOLIC BLOOD PRESSURE: 83 MMHG | OXYGEN SATURATION: 96 %

## 2024-02-05 DIAGNOSIS — K04.7 DENTAL ABSCESS: Primary | ICD-10-CM

## 2024-02-05 PROCEDURE — RXMED WILLOW AMBULATORY MEDICATION CHARGE

## 2024-02-05 PROCEDURE — 99283 EMERGENCY DEPT VISIT LOW MDM: CPT

## 2024-02-05 RX ORDER — NAPROXEN 500 MG/1
500 TABLET ORAL
Qty: 14 TABLET | Refills: 0 | Status: SHIPPED | OUTPATIENT
Start: 2024-02-05 | End: 2024-02-12

## 2024-02-05 RX ORDER — CLINDAMYCIN HYDROCHLORIDE 150 MG/1
150 CAPSULE ORAL EVERY 6 HOURS
Qty: 40 CAPSULE | Refills: 0 | Status: SHIPPED | OUTPATIENT
Start: 2024-02-05 | End: 2024-02-15

## 2024-02-05 ASSESSMENT — COLUMBIA-SUICIDE SEVERITY RATING SCALE - C-SSRS
6. HAVE YOU EVER DONE ANYTHING, STARTED TO DO ANYTHING, OR PREPARED TO DO ANYTHING TO END YOUR LIFE?: NO
1. IN THE PAST MONTH, HAVE YOU WISHED YOU WERE DEAD OR WISHED YOU COULD GO TO SLEEP AND NOT WAKE UP?: NO
2. HAVE YOU ACTUALLY HAD ANY THOUGHTS OF KILLING YOURSELF?: NO

## 2024-02-05 ASSESSMENT — PAIN - FUNCTIONAL ASSESSMENT: PAIN_FUNCTIONAL_ASSESSMENT: 0-10

## 2024-02-05 ASSESSMENT — PAIN SCALES - GENERAL: PAINLEVEL_OUTOF10: 0 - NO PAIN

## 2024-02-05 NOTE — ED PROVIDER NOTES
HPI   Chief Complaint   Patient presents with    Abscess     Pt has a lump on his jaw. Pt noticed it yesterday. Pt states it is larger since yesterdya.        This is a 43-year-old -American male presenting to the emergency room with complaints of dental pain and facial abscess.  The patient reports he has been experiencing dental pain for a while.  He just got his teeth cleaned but he is not having any pain at that time.  He is not usually under the care of [facility].  The patient reports that he woke up and noticed swelling along the gumline yesterday.  The swelling is spread to his adjacent jaw.  He denies any fevers, chills, nausea, vomiting.  Does not have any drainage from the tooth.  He did have an appointment with his dentist but he did not show up.  The patient is requesting an antibiotic.                          Néstor Coma Scale Score: 15                  Patient History   Past Medical History:   Diagnosis Date    Cellulitis and abscess of mouth 08/27/2018    Abscess of soft tissue of mouth    Cutaneous abscess of face     Facial abscess    Dental caries, unspecified 03/13/2018    Pain due to dental caries    Furuncle of buttock     Boil, buttock    Opioid abuse, uncomplicated (CMS/HCC)     Narcotic abuse    Personal history of other (healed) physical injury and trauma 01/23/2020    History of back injury    Personal history of other diseases of the musculoskeletal system and connective tissue 01/23/2020    History of low back pain    Rectal abscess 10/15/2019    Perirectal abscess     Past Surgical History:   Procedure Laterality Date    OTHER SURGICAL HISTORY  10/15/2019    Tooth extraction     Family History   Problem Relation Name Age of Onset    Cancer Maternal Grandmother      Cancer Maternal Grandfather      Cancer Paternal Grandfather       Social History     Tobacco Use    Smoking status: Every Day     Types: Cigarettes     Passive exposure: Never    Smokeless tobacco:  Never   Vaping Use    Vaping Use: Never used   Substance Use Topics    Alcohol use: Not Currently    Drug use: Not Currently       Physical Exam   ED Triage Vitals [02/05/24 1217]   Temperature Heart Rate Respirations BP   36.7 °C (98 °F) 79 18 112/83      Pulse Ox Temp src Heart Rate Source Patient Position   96 % -- -- Sitting      BP Location FiO2 (%)     Right arm --       Physical Exam  Vitals and nursing note reviewed.   HENT:      Head: Normocephalic.      Right Ear: External ear normal.      Left Ear: External ear normal.      Nose: Nose normal.      Mouth/Throat:      Pharynx: Oropharynx is clear.      Comments: The patient has very poor dentition with multiple eroded and decayed teeth.  The patient has obvious fractures with severe decay noted to tooth #29 with associated tenderness to palpation.  There is swelling to the adjacent jawline.  No focal abscess that requires incision and drainage.  Eyes:      Conjunctiva/sclera: Conjunctivae normal.   Cardiovascular:      Rate and Rhythm: Normal rate and regular rhythm.      Pulses: Normal pulses.      Heart sounds: Normal heart sounds.   Pulmonary:      Effort: Pulmonary effort is normal.      Breath sounds: Normal breath sounds.   Musculoskeletal:         General: Normal range of motion.   Skin:     General: Skin is warm.   Neurological:      General: No focal deficit present.      Mental Status: He is alert.   Psychiatric:         Mood and Affect: Mood normal.         ED Course & MDM   Diagnoses as of 02/05/24 1233   Dental abscess       Medical Decision Making  The patient was seen and evaluated by the nurse practitioner, Mendy Armstrong.  The patient has very poor dentition with multiple eroded and decayed teeth.  The patient has focal tenderness to tooth #29.  There was no focal abscess that required incision and drainage at the time.  The patient did have swelling to the adjacent jawline with no obvious cellulitic process.  The patient's vital signs are  stable.  He was nontoxic in appearance.  We advised him to obtain definitive care from a dentist.  We did provide a prescription for clindamycin and naproxen for home administration.  He he is to follow-up in the next 2 to 3 days.  He is to return if worse in any way.  The patient was discharged in stable condition with computer discharge instructions given.  The patient was agreeable with discharge planning.        Procedure  Procedures     GETACHEW Menard-BLANCA  02/05/24 1237

## 2024-07-16 PROCEDURE — 99283 EMERGENCY DEPT VISIT LOW MDM: CPT

## 2024-07-16 ASSESSMENT — COLUMBIA-SUICIDE SEVERITY RATING SCALE - C-SSRS
1. IN THE PAST MONTH, HAVE YOU WISHED YOU WERE DEAD OR WISHED YOU COULD GO TO SLEEP AND NOT WAKE UP?: NO
6. HAVE YOU EVER DONE ANYTHING, STARTED TO DO ANYTHING, OR PREPARED TO DO ANYTHING TO END YOUR LIFE?: NO
2. HAVE YOU ACTUALLY HAD ANY THOUGHTS OF KILLING YOURSELF?: NO

## 2024-07-17 ENCOUNTER — HOSPITAL ENCOUNTER (EMERGENCY)
Facility: HOSPITAL | Age: 44
Discharge: HOME | End: 2024-07-17
Attending: STUDENT IN AN ORGANIZED HEALTH CARE EDUCATION/TRAINING PROGRAM
Payer: COMMERCIAL

## 2024-07-17 VITALS
WEIGHT: 160 LBS | HEIGHT: 71 IN | BODY MASS INDEX: 22.4 KG/M2 | SYSTOLIC BLOOD PRESSURE: 110 MMHG | DIASTOLIC BLOOD PRESSURE: 78 MMHG | OXYGEN SATURATION: 96 % | RESPIRATION RATE: 15 BRPM | HEART RATE: 75 BPM | TEMPERATURE: 98.9 F

## 2024-07-17 DIAGNOSIS — L02.91 ABSCESS: Primary | ICD-10-CM

## 2024-07-17 RX ORDER — CEPHALEXIN 500 MG/1
500 CAPSULE ORAL 4 TIMES DAILY
Qty: 20 CAPSULE | Refills: 0 | Status: SHIPPED | OUTPATIENT
Start: 2024-07-17 | End: 2024-07-22

## 2024-07-17 ASSESSMENT — PAIN SCALES - GENERAL: PAINLEVEL_OUTOF10: 0 - NO PAIN

## 2024-07-17 ASSESSMENT — LIFESTYLE VARIABLES
TOTAL SCORE: 0
HAVE YOU EVER FELT YOU SHOULD CUT DOWN ON YOUR DRINKING: NO
EVER FELT BAD OR GUILTY ABOUT YOUR DRINKING: NO
EVER HAD A DRINK FIRST THING IN THE MORNING TO STEADY YOUR NERVES TO GET RID OF A HANGOVER: NO
HAVE PEOPLE ANNOYED YOU BY CRITICIZING YOUR DRINKING: NO

## 2024-07-17 ASSESSMENT — PAIN - FUNCTIONAL ASSESSMENT: PAIN_FUNCTIONAL_ASSESSMENT: 0-10

## 2024-07-17 NOTE — ED PROVIDER NOTES
"HPI   Chief Complaint   Patient presents with    Wound Check     PT has wound on his testicles for \"couple weeks\"       This is a 43-year-old male who presents to the emergency department for a lesion on his scrotum.  Patient states that it was draining before but it came back.  While here it did start draining it has improved his pain.  He denies any fevers or chills no other issues or concerns.                          Néstor Coma Scale Score: 15                  Patient History   Past Medical History:   Diagnosis Date    Cellulitis and abscess of mouth 08/27/2018    Abscess of soft tissue of mouth    Cutaneous abscess of face     Facial abscess    Dental caries, unspecified 03/13/2018    Pain due to dental caries    Furuncle of buttock     Boil, buttock    Opioid abuse, uncomplicated (Multi)     Narcotic abuse    Personal history of other (healed) physical injury and trauma 01/23/2020    History of back injury    Personal history of other diseases of the musculoskeletal system and connective tissue 01/23/2020    History of low back pain    Rectal abscess 10/15/2019    Perirectal abscess     Past Surgical History:   Procedure Laterality Date    OTHER SURGICAL HISTORY  10/15/2019    Tooth extraction     Family History   Problem Relation Name Age of Onset    Cancer Maternal Grandmother      Cancer Maternal Grandfather      Cancer Paternal Grandfather       Social History     Tobacco Use    Smoking status: Every Day     Types: Cigarettes     Passive exposure: Never    Smokeless tobacco: Never   Vaping Use    Vaping status: Never Used   Substance Use Topics    Alcohol use: Not Currently    Drug use: Yes     Types: Marijuana       Physical Exam   ED Triage Vitals [07/16/24 2321]   Temperature Heart Rate Respirations BP   37.2 °C (98.9 °F) 74 16 112/79      Pulse Ox Temp src Heart Rate Source Patient Position   96 % -- -- --      BP Location FiO2 (%)     -- --       Physical Exam  Constitutional:       General: He is " not in acute distress.  HENT:      Head: Normocephalic and atraumatic.      Right Ear: Tympanic membrane normal.      Left Ear: Tympanic membrane normal.      Mouth/Throat:      Mouth: Mucous membranes are moist.   Eyes:      Extraocular Movements: Extraocular movements intact.      Conjunctiva/sclera: Conjunctivae normal.      Pupils: Pupils are equal, round, and reactive to light.   Cardiovascular:      Rate and Rhythm: Normal rate and regular rhythm.      Heart sounds: No murmur heard.  Pulmonary:      Effort: Pulmonary effort is normal. No respiratory distress.      Breath sounds: Normal breath sounds. No stridor. No wheezing or rales.   Abdominal:      General: Bowel sounds are normal. There is no distension.      Tenderness: There is no abdominal tenderness. There is no guarding or rebound.   Genitourinary:     Comments: On right scrotal he does have a small 0.5 cm abscess that is already draining no surrounding erythema otherwise.  No signs of Ruth's gangrene and no signs of crepitus  Musculoskeletal:         General: No swelling, tenderness or deformity. Normal range of motion.   Skin:     General: Skin is warm and dry.      Coloration: Skin is not jaundiced.      Findings: No bruising or lesion.   Neurological:      General: No focal deficit present.      Mental Status: He is alert and oriented to person, place, and time. Mental status is at baseline.      Cranial Nerves: No cranial nerve deficit.      Motor: No weakness.   Psychiatric:         Mood and Affect: Mood normal.       Labs Reviewed - No data to display  No orders to display       ED Course & MDM        Medical Decision Making  This is a 43-year-old male present emerged permit for a lesion on his scrotum on exam it appears to be a 0.5 cm abscess that patient is now stating started to drain while here.  There appears to be no signs of Ruth's gangrene or worsening erythema.  I have suspicion for any other testicular torsion or testicular  pathology.  At this time we will send patient home with an antibiotic given that his abscess already draining does not need to be drained.  He denies any symptoms of systemic symptoms such as fevers or chills and he has no signs of sepsis criteria here        Procedure  Procedures     Eliane Mancilla MD  07/17/24 0040

## 2025-01-21 NOTE — ANESTHESIA PRE PROCEDURE
Department of Anesthesiology  Preprocedure Note       Name:  Bryce Arizmendi   Age:  38 y.o.  :  1980                                          MRN:  [id number]         Date:  3/14/2023      Surgeon: Cassie Zarate):  Connor Carr DO    Procedure: Procedure(s):  CLOSED REDUCTION PERC PINNING VS ORIF LEFT FOOT LISFRANC    Medications prior to admission:   Prior to Admission medications    Medication Sig Start Date End Date Taking? Authorizing Provider   buprenorphine-naloxone (SUBOXONE) 8-2 MG SUBL SL tablet Place 2 tablets under the tongue daily. Yes Historical Provider, MD   mirtazapine (REMERON) 30 MG tablet Take 45 mg by mouth nightly   Yes Historical Provider, MD   QUEtiapine (SEROQUEL) 25 MG tablet Take 50 mg by mouth daily   Yes Historical Provider, MD   hydrOXYzine HCl (ATARAX) 10 MG tablet Take 25 mg by mouth nightly   Yes Historical Provider, MD   predniSONE (DELTASONE) 10 MG tablet Take 10 mg by mouth daily   Yes Historical Provider, MD   sodium bicarbonate 325 MG tablet Take 650 mg by mouth 2 times daily   Yes Historical Provider, MD   oxyCODONE 5 MG capsule Take 10 mg by mouth every 6 hours as needed for Pain.    Yes Historical Provider, MD   levoFLOXacin (LEVAQUIN) 750 MG tablet Take 750 mg by mouth every 48 hours   Yes Historical Provider, MD   ciprofloxacin (CIPRO) 500 MG tablet Take 500 mg by mouth every 12 hours   Yes Historical Provider, MD   clindamycin (CLEOCIN) 150 MG capsule Take 150 mg by mouth 3 times daily   Yes Historical Provider, MD   chlorhexidine (PERIDEX) 0.12 % solution Swish and spit 15 mLs 2 times daily   Yes Historical Provider, MD       Current medications:    Current Facility-Administered Medications   Medication Dose Route Frequency Provider Last Rate Last Admin    ibuprofen (ADVIL;MOTRIN) tablet 400 mg  400 mg Oral Once [employer], DO        ceFAZolin (ANCEF) 2,000 mg in sterile water 20 mL IV syringe  2,000 mg IntraVENous On Call to [address], DO Allergies: Allergies   Allergen Reactions    Pcn [Penicillins] Swelling and Rash       Problem List:    Patient Active Problem List   Diagnosis Code    Lisfranc dislocation, left, initial encounter R15.106W       Past Medical History:  History reviewed. No pertinent past medical history. Past Surgical History:  History reviewed. No pertinent surgical history. Social History:    Social History     Tobacco Use    Smoking status: Unknown    Smokeless tobacco: Not on file   Substance Use Topics    Alcohol use: Not Currently                                Counseling given: Not Answered      Vital Signs (Current):   Vitals:    03/13/23 2325 03/14/23 0451 03/14/23 1008 03/14/23 1345   BP: (!) 167/105 (!) 147/68 115/78 118/83   Pulse: 66 71 89 70   Resp: 18 19 18 18   Temp: 97.3 °F (36.3 °C)  97.7 °F (36.5 °C) 98.3 °F (36.8 °C)   TempSrc:   Oral Temporal   SpO2: 93% 97% 97% 98%   Weight: 150 lb (68 kg)      Height: 5' 11\" (1.803 m)                                                 BP Readings from Last 3 Encounters:   03/14/23 118/83       NPO Status:                                                   Date of last liquid consumption: 03/13/23                        Date of last solid food consumption: 03/13/23    BMI:   Wt Readings from Last 3 Encounters:   03/13/23 150 lb (68 kg)     Body mass index is 20.92 kg/m².     CBC:   Lab Results   Component Value Date/Time    WBC 11.3 03/14/2023 07:32 AM    RBC 4.90 03/14/2023 07:32 AM    HGB 15.4 03/14/2023 07:32 AM    HCT 45.7 03/14/2023 07:32 AM    MCV 93.3 03/14/2023 07:32 AM    RDW 12.9 03/14/2023 07:32 AM     03/14/2023 07:32 AM       CMP:   Lab Results   Component Value Date/Time     03/14/2023 07:32 AM    K 3.4 03/14/2023 07:32 AM    CL 98 03/14/2023 07:32 AM    CO2 27 03/14/2023 07:32 AM    BUN 10 03/14/2023 07:32 AM    CREATININE 0.9 03/14/2023 07:32 AM    LABGLOM >60 03/14/2023 07:32 AM    GLUCOSE 98 03/14/2023 07:32 AM    CALCIUM 9.8 2023 07:32 AM       POC Tests: No results for input(s): POCGLU, POCNA, POCK, POCCL, POCBUN, POCHEMO, POCHCT in the last 72 hours. Coags:   Lab Results   Component Value Date/Time    PROTIME 14.5 2023 07:32 AM    INR 1.3 2023 07:32 AM       HCG (If Applicable): No results found for: PREGTESTUR, PREGSERUM, HCG, HCGQUANT     ABGs: No results found for: PHART, PO2ART, VAA0CYE, GHT3PJK, BEART, D7QSBRJA     Type & Screen (If Applicable):  No results found for: LABABO, LABRH    Drug/Infectious Status (If Applicable):  No results found for: HIV, HEPCAB    COVID-19 Screening (If Applicable): No results found for: COVID19        Anesthesia Evaluation  Patient summary reviewed no history of anesthetic complications:   Airway: Mallampati: II  TM distance: >3 FB   Neck ROM: full  Mouth opening: > = 3 FB   Dental:          Pulmonary:Negative Pulmonary ROS breath sounds clear to auscultation                             Cardiovascular:          ECG reviewed  Rhythm: regular  Rate: normal                 ROS comment: EKG 3/14/2023:  Normal sinus rhythm  Normal ECG  No previous ECGs available  Confirmed by Reinier Cantu ([id number]) on 3/14/2023 2:26:10 PM     Neuro/Psych:                ROS comment: Paranoia  Patient on suboxone  Multisubstance abuse GI/Hepatic/Renal: Neg GI/Hepatic/Renal ROS            Endo/Other:    (+) hypothyroidism::., .                  ROS comment: On prednisone  Left foot Lisfranc fracture dislocation Abdominal:             Vascular: negative vascular ROS.          Other Findings:        Department of Anesthesiology  Preprocedure Note       Name:  Jose Aviles   Age:  38 y.o.  :  1980                                          MRN:  [id number]         Date:  3/15/2023      Surgeon: Sarah White):  Chase Valentino DO    Procedure: Procedure(s):  CLOSED REDUCTION PERC PINNING VS ORIF LEFT FOOT LISFRANC    Medications prior to admission:   Prior to Admission medications    Medication Sig Start Date End Date Taking? Authorizing Provider   buprenorphine-naloxone (SUBOXONE) 8-2 MG SUBL SL tablet Place 2 tablets under the tongue daily. Historical Provider, MD   mirtazapine (REMERON) 30 MG tablet Take 45 mg by mouth nightly    Historical Provider, MD   QUEtiapine (SEROQUEL) 25 MG tablet Take 50 mg by mouth daily    Historical Provider, MD   hydrOXYzine HCl (ATARAX) 10 MG tablet Take 25 mg by mouth nightly    Historical Provider, MD   predniSONE (DELTASONE) 10 MG tablet Take 10 mg by mouth daily    Historical Provider, MD   sodium bicarbonate 325 MG tablet Take 650 mg by mouth 2 times daily    Historical Provider, MD   oxyCODONE 5 MG capsule Take 10 mg by mouth every 6 hours as needed for Pain. Historical Provider, MD   levoFLOXacin (LEVAQUIN) 750 MG tablet Take 750 mg by mouth every 48 hours    Historical Provider, MD   ciprofloxacin (CIPRO) 500 MG tablet Take 500 mg by mouth every 12 hours    Historical Provider, MD   clindamycin (CLEOCIN) 150 MG capsule Take 150 mg by mouth 3 times daily    Historical Provider, MD   chlorhexidine (PERIDEX) 0.12 % solution Swish and spit 15 mLs 2 times daily    Historical Provider, MD       Current medications:    No current facility-administered medications for this visit. No current outpatient medications on file.      Facility-Administered Medications Ordered in Other Visits   Medication Dose Route Frequency Provider Last Rate Last Admin    sodium chloride flush 0.9 % injection 5-40 mL  5-40 mL IntraVENous 2 times per day Xena Reardon MD        sodium chloride flush 0.9 % injection 5-40 mL  5-40 mL IntraVENous PRN Xena Reardon MD        0.9 % sodium chloride infusion   IntraVENous PRN Xena Reardon MD        ondansetron (ZOFRAN-ODT) disintegrating tablet 4 mg  4 mg Oral Q8H PRN Xena Reardon MD        Or    ondansetron [facility]) injection 4 mg  4 mg IntraVENous Q6H PRN Xena Reardon MD        polyethylene glycol (GLYCOLAX) packet 17 g  17 g Oral Daily PRN Evelyn Romero MD        acetaminophen (TYLENOL) tablet 650 mg  650 mg Oral Q6H PRN Evelyn Romero MD        Or    acetaminophen (TYLENOL) suppository 650 mg  650 mg Rectal Q6H PRN Evelyn Romero MD        ibuprofen (ADVIL;MOTRIN) tablet 400 mg  400 mg Oral Once Preston Armenta DO        buprenorphine-naloxone (SUBOXONE) 8-2 MG SL tablet 2 tablet  2 tablet SubLINGual Daily Stevenson Rosas MD   2 tablet at 03/15/23 0904    mirtazapine (REMERON) tablet 45 mg  45 mg Oral Nightly Stevenson Rosas MD   45 mg at 03/14/23 2243    QUEtiapine (SEROQUEL) tablet 50 mg  50 mg Oral Daily Stevenson Rosas MD   50 mg at 03/15/23 4903    hydrOXYzine pamoate (VISTARIL) capsule 25 mg  25 mg Oral Nightly Stevenson Rosas MD   25 mg at 03/14/23 2158    oxyCODONE HCl (OXY-IR) immediate release tablet 10 mg  10 mg Oral Q6H PRN Stevenson Rosas MD   10 mg at 03/15/23 0443       Allergies: Allergies   Allergen Reactions    Pcn [Penicillins] Swelling and Rash       Problem List:    Patient Active Problem List   Diagnosis Code    Lisfranc dislocation, left, initial encounter S93.325A    Closed displaced fracture of distal phalanx of lesser toe of left foot, initial encounter X08.696Q       Past Medical History:  No past medical history on file. Past Surgical History:  No past surgical history on file. Social History:    Social History     Tobacco Use    Smoking status: Unknown    Smokeless tobacco: Not on file   Substance Use Topics    Alcohol use: Not Currently                                Counseling given: Not Answered      Vital Signs (Current): There were no vitals filed for this visit.                                            BP Readings from Last 3 Encounters:   03/15/23 (!) 143/93       NPO Status:                                                                                 BMI:   Wt Readings from Last 3 Encounters:   03/13/23 150 lb (68 kg)     There is no height or weight on file to calculate BMI.    CBC:   Lab [de-identified] : Constitutional o Appearance : well-nourished, well developed, alert, in no acute distress  Head and Face o Head :  Inspection : atraumatic, normocephalic o Face :  Inspection : no visible rash or discoloration Respiratory o Respiratory Effort: breathing unlabored  Neurologic o Mental Status Examination :  Orientation : alert and oriented X 3 Psychiatric o Mood and Affect: mood normal, affect appropriate Cardiovascular o Observation/Palpation : no swelling Lymphatic o Additional Nodes : No palpable lymph nodes present  Lumbosacral Spine o Inspection : no visible rash or discoloration o Palpation : no paraspinal musculature tenderness o Range of Motion : arc of motion full in all planes, no crepitance or pain with ROM o Muscle Strength : paraspinal muscle strength and tone within normal limits o Muscle Tone : paraspinal muscle strength and tone within normal limits o Tests: straight leg test negative bilaterally, BRANDI test negative bilaterally   Right Lower Extremity o Buttock : no tenderness, swelling or deformities o Right Hip :  Inspection/Palpation : no tenderness, no swelling or deformities  Range of Motion : full flexion and limited external rotation limited no crepitance  Stability : joint stability intact  Strength : extension, flexion, adduction, abduction, internal rotation and external rotation 3+/5   Tests: Obers test negative  Left Lower Extremity o Buttock : no tenderness, swelling or deformities  o Left Hip :  Inspection/Palpation : no tenderness, no swelling or deformities  Range of Motion : full flexion and painful and limited external rotation, no crepitance  Stability : joint stability intact  Strength : extension, flexion, adduction, abduction, internal rotation and external rotation 3+/5   Tests: Obers test negative  o Left Knee :  Inspection/Palpation : no tenderness to palpation, no swelling  Range of Motion : 0-120active flexion and extension full and painless, no crepitance  Stability : no valgus or varus instability present on provocative testing  Strength : flexion and extension 3/5  Tests and Signs : negative Anterior Drawer, negative Lachman, negative Rupesh's test   Gait: mild  abductor lurch on the bilaterally , limited core strength, no foot drop, leg lengths are equal, reasonable good core strength, no significant extremity swelling or lymphedema, good proprioception and balance Results   Component Value Date/Time    WBC 11.3 03/14/2023 07:32 AM    RBC 4.90 03/14/2023 07:32 AM    HGB 15.4 03/14/2023 07:32 AM    HCT 45.7 03/14/2023 07:32 AM    MCV 93.3 03/14/2023 07:32 AM    RDW 12.9 03/14/2023 07:32 AM     03/14/2023 07:32 AM       CMP:   Lab Results   Component Value Date/Time     03/15/2023 10:11 AM    K 3.1 03/15/2023 10:11 AM    CL 99 03/15/2023 10:11 AM    CO2 30 03/15/2023 10:11 AM    BUN 7 03/15/2023 10:11 AM    CREATININE 0.9 03/15/2023 10:11 AM    LABGLOM >60 03/15/2023 10:11 AM    GLUCOSE 100 03/15/2023 10:11 AM    CALCIUM 9.2 03/15/2023 10:11 AM       POC Tests: No results for input(s): POCGLU, POCNA, POCK, POCCL, POCBUN, POCHEMO, POCHCT in the last 72 hours. Coags:   Lab Results   Component Value Date/Time    PROTIME 14.5 03/14/2023 07:32 AM    INR 1.3 03/14/2023 07:32 AM       HCG (If Applicable): No results found for: PREGTESTUR, PREGSERUM, HCG, HCGQUANT     ABGs: No results found for: PHART, PO2ART, SJX0WXZ, QZT1PTG, BEART, G9VBPDHO     Type & Screen (If Applicable):  No results found for: LABABO, LABRH    Drug/Infectious Status (If Applicable):  No results found for: HIV, HEPCAB    COVID-19 Screening (If Applicable): No results found for: COVID19        Anesthesia Evaluation    Airway:           Dental:          Pulmonary:                              Cardiovascular:                   ROS comment: Normal sinus rhythm  Normal ECG  No previous ECGs available  Confirmed by Faylene Cuff ([id number]) on 3/14/2023 2:26:10 PM     Neuro/Psych:                ROS comment: Paranoia GI/Hepatic/Renal:             Endo/Other:                     Abdominal:             Vascular: Other Findings:           Anesthesia Plan      general     ASA 1 - emergent       Induction: intravenous. continuous noninvasive hemodynamic monitor  MIPS: Postoperative opioids intended and Prophylactic antiemetics administered.                         Mariia Rendon MD 3/15/2023             Anesthesia Plan      general     ASA 3       Induction: intravenous. MIPS: Postoperative opioids intended, Prophylactic antiemetics administered and Postoperative trial extubation. Anesthetic plan and risks discussed with patient. Per medical floor, patient is appropriate to provide consent for procedure and anesthesia.         Sudeep Renae MD  3/15/23

## 2025-03-23 ENCOUNTER — OFFICE VISIT (OUTPATIENT)
Dept: URGENT CARE | Age: 45
End: 2025-03-23
Payer: COMMERCIAL

## 2025-03-23 VITALS
DIASTOLIC BLOOD PRESSURE: 76 MMHG | TEMPERATURE: 98.7 F | OXYGEN SATURATION: 97 % | HEART RATE: 55 BPM | SYSTOLIC BLOOD PRESSURE: 122 MMHG

## 2025-03-23 DIAGNOSIS — K04.7 DENTAL ABSCESS: Primary | ICD-10-CM

## 2025-03-23 PROCEDURE — 99070 SPECIAL SUPPLIES PHYS/QHP: CPT | Performed by: PHYSICIAN ASSISTANT

## 2025-03-23 PROCEDURE — 99213 OFFICE O/P EST LOW 20 MIN: CPT | Performed by: PHYSICIAN ASSISTANT

## 2025-03-23 RX ORDER — CLINDAMYCIN HYDROCHLORIDE 300 MG/1
300 CAPSULE ORAL 3 TIMES DAILY
Qty: 30 CAPSULE | Refills: 0 | Status: SHIPPED | OUTPATIENT
Start: 2025-03-23 | End: 2025-04-02

## 2025-03-23 RX ORDER — IBUPROFEN 800 MG/1
800 TABLET ORAL 3 TIMES DAILY PRN
Qty: 60 TABLET | Refills: 0 | Status: SHIPPED | OUTPATIENT
Start: 2025-03-23 | End: 2025-05-22

## 2025-03-23 ASSESSMENT — ENCOUNTER SYMPTOMS
FEVER: 0
HEADACHES: 1
CHILLS: 0
COUGH: 1
SORE THROAT: 0

## 2025-03-23 NOTE — PROGRESS NOTES
Subjective   Patient ID: Guanaco Cardenas is a 44 y.o. male. They present today with a chief complaint of Oral Pain.    History of Present Illness  Patient presents today for left lower dental pain.  This been going on for a week.  He feels it is infected again.  He had this infected back in December which responded well to antibiotics.  He was post have his tooth pulled though never kept the appointment.  He states that there is localized swelling, causing headaches and slight cough.  He denies any ear pain, sore throat or runny nose.      Oral Pain  Associated symptoms: cough and headaches    Associated symptoms: no congestion, no ear pain, no fever and no sore throat        Past Medical History  Allergies as of 03/23/2025 - Reviewed 07/16/2024   Allergen Reaction Noted    Amoxicillin Unknown 04/03/2023    Penicillins Unknown 03/02/2023       (Not in a hospital admission)       Past Medical History:   Diagnosis Date    Cellulitis and abscess of mouth 08/27/2018    Abscess of soft tissue of mouth    Cutaneous abscess of face     Facial abscess    Dental caries, unspecified 03/13/2018    Pain due to dental caries    Furuncle of buttock     Boil, buttock    Opioid abuse, uncomplicated (Multi)     Narcotic abuse    Personal history of other (healed) physical injury and trauma 01/23/2020    History of back injury    Personal history of other diseases of the musculoskeletal system and connective tissue 01/23/2020    History of low back pain    Rectal abscess 10/15/2019    Perirectal abscess       Past Surgical History:   Procedure Laterality Date    OTHER SURGICAL HISTORY  10/15/2019    Tooth extraction        reports that he has been smoking cigarettes. He has never been exposed to tobacco smoke. He has never used smokeless tobacco. He reports that he does not currently use alcohol. He reports current drug use. Drug: Marijuana.    Review of Systems  Review of Systems   Constitutional:  Negative for chills and fever.    HENT:  Positive for dental problem. Negative for congestion, ear pain and sore throat.    Respiratory:  Positive for cough.    Neurological:  Positive for headaches.                                  Objective    Vitals:    03/23/25 1828   BP: 122/76   Pulse: 55   Temp: 37.1 °C (98.7 °F)   SpO2: 97%     No LMP for male patient.    Physical Exam  Constitutional:       General: He is in acute distress (in obvious discomfort).   HENT:      Head:      Comments: Overall poor dentition.  Left lower molar does appear with dental caries and surrounding gum abscess.  No active drainage.     Nose: No congestion or rhinorrhea.      Mouth/Throat:      Mouth: Mucous membranes are moist.      Pharynx: No oropharyngeal exudate or posterior oropharyngeal erythema.   Cardiovascular:      Rate and Rhythm: Normal rate and regular rhythm.      Heart sounds: Normal heart sounds.   Pulmonary:      Effort: Pulmonary effort is normal. No respiratory distress.      Breath sounds: Normal breath sounds. No wheezing, rhonchi or rales.   Neurological:      Mental Status: He is alert.         Procedures    Point of Care Test & Imaging Results from this visit  No results found for this visit on 03/23/25.   No results found.    Diagnostic study results (if any) were reviewed by Michelle Cardenas PA-C.    Assessment/Plan   Allergies, medications, history, and pertinent labs/EKGs/Imaging reviewed by Michelle Cardenas PA-C.     Medical Decision Making  MDM- Signs and symptoms consistent with dental infection. No evidence of severe abscess requiring drainage. Symptoms and exam are NOT suggestive of peritonsillar abscess or Ludwigs angina. Will cover with antibiotics and patient will follow with dentist for further evaluation and care. Supportive measures for pain. Advised to present to ED if symptoms change or worsen.  Patient verbalized understanding and agrees with plan.      Orders and Diagnoses  Diagnoses and all orders for this visit:  Dental  abscess  -     clindamycin (Cleocin HCL) 300 mg capsule; Take 1 capsule (300 mg) by mouth 3 times a day for 10 days.  -     ibuprofen 800 mg tablet; Take 1 tablet (800 mg) by mouth 3 times a day as needed for mild pain (1 - 3) (pain).      Medical Admin Record      Patient disposition: Home    Electronically signed by Michelle Cardenas PA-C  7:02 PM

## 2025-04-02 ENCOUNTER — OFFICE VISIT (OUTPATIENT)
Dept: URGENT CARE | Age: 45
End: 2025-04-02
Payer: COMMERCIAL

## 2025-04-02 VITALS
OXYGEN SATURATION: 93 % | TEMPERATURE: 98.1 F | HEART RATE: 59 BPM | SYSTOLIC BLOOD PRESSURE: 104 MMHG | DIASTOLIC BLOOD PRESSURE: 64 MMHG | RESPIRATION RATE: 14 BRPM

## 2025-04-02 DIAGNOSIS — N49.9: Primary | ICD-10-CM

## 2025-04-02 PROCEDURE — 99213 OFFICE O/P EST LOW 20 MIN: CPT | Performed by: NURSE PRACTITIONER

## 2025-04-02 RX ORDER — SULFAMETHOXAZOLE AND TRIMETHOPRIM 800; 160 MG/1; MG/1
1 TABLET ORAL 2 TIMES DAILY
Qty: 14 TABLET | Refills: 0 | Status: SHIPPED | OUTPATIENT
Start: 2025-04-02

## 2025-04-02 RX ORDER — IBUPROFEN 800 MG/1
800 TABLET ORAL 3 TIMES DAILY PRN
Qty: 60 TABLET | Refills: 0 | Status: SHIPPED | OUTPATIENT
Start: 2025-04-02 | End: 2025-06-01

## 2025-04-02 RX ORDER — CEPHALEXIN 500 MG/1
500 CAPSULE ORAL 2 TIMES DAILY
Qty: 14 CAPSULE | Refills: 0 | Status: SHIPPED | OUTPATIENT
Start: 2025-04-02 | End: 2025-04-09

## 2025-04-02 NOTE — PROGRESS NOTES
Subjective   Patient ID: Guanaco Cardenas is a 44 y.o. male. They present today with a chief complaint of Abscess (Genital area).    History of Present Illness  44-year-old male presents with pain, redness, swelling and malodorous, purulent discharge from the left groin for 1 day.  He rates the pain at 7 out of 10.  Patient denies pain in the scrotal sac.  Patient did not try over-the-counter medications.  Patient states he had similar groin abscess in the past and was treated with an oral antibiotic.      History provided by:  Patient   used: No    Abscess  Abscess location: Left groin.  Abscess quality: draining, painful, redness and weeping    Abscess quality: no fluctuance, no induration, no itching and no warmth    Red streaking: no    Duration:  1 day  Progression:  Worsening      Past Medical History  Allergies as of 04/02/2025 - Reviewed 04/02/2025   Allergen Reaction Noted    Amoxicillin Rash 04/03/2023    Penicillins Rash 03/02/2023       (Not in a hospital admission)       Past Medical History:   Diagnosis Date    Cellulitis and abscess of mouth 08/27/2018    Abscess of soft tissue of mouth    Cutaneous abscess of face     Facial abscess    Dental caries, unspecified 03/13/2018    Pain due to dental caries    Furuncle of buttock     Boil, buttock    Opioid abuse, uncomplicated (Multi)     Narcotic abuse    Personal history of other (healed) physical injury and trauma 01/23/2020    History of back injury    Personal history of other diseases of the musculoskeletal system and connective tissue 01/23/2020    History of low back pain    Rectal abscess 10/15/2019    Perirectal abscess       Past Surgical History:   Procedure Laterality Date    OTHER SURGICAL HISTORY  10/15/2019    Tooth extraction        reports that he has been smoking cigarettes. He has never been exposed to tobacco smoke. He has never used smokeless tobacco. He reports that he does not currently use alcohol. He reports  current drug use. Drug: Marijuana.    Review of Systems  Review of Systems   Genitourinary:  Positive for genital sores.                                  Objective    Vitals:    04/02/25 1426   BP: 104/64   Pulse: 59   Resp: 14   Temp: 36.7 °C (98.1 °F)   SpO2: 93%     No LMP for male patient.    Physical Exam  Vitals and nursing note reviewed.   Constitutional:       Appearance: Normal appearance.   HENT:      Head: Normocephalic and atraumatic.   Cardiovascular:      Rate and Rhythm: Normal rate and regular rhythm.   Pulmonary:      Effort: Pulmonary effort is normal.      Breath sounds: Normal breath sounds.   Genitourinary:      Neurological:      Mental Status: He is alert.         Procedures    Point of Care Test & Imaging Results from this visit  No results found for this visit on 04/02/25.   Imaging  No results found.    Cardiology, Vascular, and Other Imaging  No other imaging results found for the past 2 days      Diagnostic study results (if any) were reviewed by DANNY Lemus.    Assessment/Plan   Allergies, medications, history, and pertinent labs/EKGs/Imaging reviewed by DANNY Lemus.     Medical Decision Making  Treatment: Take the antibiotic with food.  Eat yogurt or take probiotic once a day.  Symptoms should improve in 2 to 3 days.   Take Tylenol and/or ibuprofen as needed for aches and pain and/or fever.  Plan: Skin care discussed. Wash with soap and water daily and pat dry. Leave open to room air, if no drainage. Apply dressing if draining.  Monitor for signs and symptoms of worsening infection including pain, redness, swelling, red streaks following veins, discharge and fever. Call 911 or go to the nearest ER.   She verbalized understanding and left in stable condition.    Diagnoses and all orders for this visit:  Abscess of male genital organ  -     sulfamethoxazole-trimethoprim (Bactrim DS) 800-160 mg tablet; Take 1 tablet by mouth 2 times a day.  -     cephalexin (Keflex) 500  mg capsule; Take 1 capsule (500 mg) by mouth 2 times a day for 7 days.  -     ibuprofen 800 mg tablet; Take 1 tablet (800 mg) by mouth 3 times a day as needed for mild pain (1 - 3) (pain).      Medical Admin Record      Patient disposition: Home    Electronically signed by DANNY Lemus  3:02 PM

## 2025-06-26 ENCOUNTER — HOSPITAL ENCOUNTER (EMERGENCY)
Facility: HOSPITAL | Age: 45
Discharge: HOME | End: 2025-06-26
Payer: COMMERCIAL

## 2025-06-26 VITALS
HEART RATE: 97 BPM | BODY MASS INDEX: 21 KG/M2 | DIASTOLIC BLOOD PRESSURE: 92 MMHG | RESPIRATION RATE: 16 BRPM | TEMPERATURE: 96.9 F | SYSTOLIC BLOOD PRESSURE: 134 MMHG | OXYGEN SATURATION: 98 % | HEIGHT: 71 IN | WEIGHT: 150 LBS

## 2025-06-26 DIAGNOSIS — E86.0 DEHYDRATION: Primary | ICD-10-CM

## 2025-06-26 PROCEDURE — 96360 HYDRATION IV INFUSION INIT: CPT

## 2025-06-26 PROCEDURE — 2500000004 HC RX 250 GENERAL PHARMACY W/ HCPCS (ALT 636 FOR OP/ED): Performed by: PHYSICIAN ASSISTANT

## 2025-06-26 PROCEDURE — 99284 EMERGENCY DEPT VISIT MOD MDM: CPT | Mod: 25

## 2025-06-26 RX ADMIN — SODIUM CHLORIDE 1000 ML: 0.9 INJECTION, SOLUTION INTRAVENOUS at 19:35

## 2025-06-26 ASSESSMENT — PAIN - FUNCTIONAL ASSESSMENT: PAIN_FUNCTIONAL_ASSESSMENT: 0-10

## 2025-06-26 ASSESSMENT — PAIN DESCRIPTION - PAIN TYPE: TYPE: ACUTE PAIN

## 2025-06-26 ASSESSMENT — PAIN DESCRIPTION - DESCRIPTORS: DESCRIPTORS: ACHING

## 2025-06-26 ASSESSMENT — PAIN SCALES - GENERAL: PAINLEVEL_OUTOF10: 3

## 2025-06-26 ASSESSMENT — PAIN DESCRIPTION - LOCATION: LOCATION: LEG

## 2025-06-26 NOTE — ED PROVIDER NOTES
EMERGENCY MEDICINE EVALUATION NOTE    History of Present Illness     Chief Complaint:   Chief Complaint   Patient presents with    concern dehydration       HPI: Guanaco Cardenas is a 44 y.o. male presents with a chief complaint of concern for dehydration.  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  He states that he just feels down and feels like he needs a bag of fluids.  Patient denies any other symptoms such as chest pain shortness of breath, lightheaded, dizziness.  Patient he denies any change in the color of his urine.  Patient denies any fevers or chills.    Previous History   Medical History[1]  Surgical History[2]  Social History[3]  Family History[4]  Allergies[5]  Current Outpatient Medications   Medication Instructions    albuterol 90 mcg/actuation inhaler 2 puffs, Every 4 hours PRN    aspirin 81 mg, Daily RT    buprenorphine-naloxone (Suboxone) 8-2 mg SL film Suboxone 8-2 MG Sublingual Film   Refills: 0       Active    busPIRone (Buspar) 30 mg tablet 1 tablet, 3 times daily    chlorhexidine (Peridex) 0.12 % solution 15 mL, 2 times daily    diphenhydrAMINE (BENADRYL) 25 mg, Every 4 hours PRN    haloperidol lactate (HALDOL) 5 mg, Every 8 hours PRN    hydrOXYzine HCL (ATARAX) 25 mg, oral, Nightly    metroNIDAZOLE (FLAGYL) 500 mg, Every 8 hours    mirtazapine (REMERON) 45 mg, oral, Nightly    mupirocin (Bactroban) 2 % cream 1 Application, 3 times daily    nicotine (Nicoderm CQ) 21 mg/24 hr patch 1 patch, Daily RT    OLANZapine (ZYPREXA) 10 mg, Nightly    oxyCODONE (ROXICODONE) 10 mg, Every 6 hours PRN    QUEtiapine (SEROQUEL) 50 mg, oral, Nightly    risperiDONE (RISPERDAL) 2 mg, Daily    sulfamethoxazole-trimethoprim (Bactrim DS) 800-160 mg tablet 1 tablet, oral, 2 times daily    traZODone (DESYREL) 50 mg, Nightly       Physical Exam     Appearance:  "Alert, oriented , cooperative,  in no acute distress.      Skin: Intact,  dry skin, no lesions, rash, petechiae or purpura.      Eyes: PERRLA, EOMs intact,  Conjunctiva pink      ENT: Hearing grossly intact. Pharynx clear, uvula midline.      Neck: Supple. Trachea at midline. No lymphadenopathy.     Pulmonary: Clear bilaterally. No rales, rhonchi or wheezing. No accessory muscle use or stridor.     Cardiac: Normal rate and rhythm without murmur     Abdomen: Soft, nontender, active bowel sounds.     Musculoskeletal: Full range of motion.      Neurological:Cranial nerves II through XII are grossly intact, normal sensation, no weakness, no focal findings identified.     Results   Labs Reviewed - No data to display  No orders to display         ED Course & Medical Decision Making     Medications   sodium chloride 0.9 % bolus 1,000 mL (1,000 mL intravenous New Bag 6/26/25 1935)     Heart Rate:  [97]   Temperature:  [36.1 °C (96.9 °F)]   Respirations:  [16]   BP: (134)/(92)   Height:  [180.3 cm (5' 11\")]   Weight:  [68 kg (150 lb)]   Pulse Ox:  [98 %]    ED Course as of 06/26/25 2002   Thu Jun 26, 2025 1927 Patient is refusing blood work at this time.  Patient states that he just needs a bag of fluids and he will feel much better.  Patient was offered CBC CMP CK to make sure that there is no rhabdo.  He states he does not want any of that he just wants 1 bag of fluids and he would like to be discharged. [CJ]   2000 Patient currently receiving IV fluids.  To be formally discharged at this time and will be given papers once his fluids are done running.  Patient encouraged to follow-up with primary care provider in 1 to 2 days or return here immediately with any worsening symptoms. [CJ]      ED Course User Index  [CJ] Vinay Charles PA-C         Diagnoses as of 06/26/25 2002   Dehydration       Procedures   Procedures    Diagnosis     1. Dehydration        Disposition   Discharged    ED Prescriptions    None   "       Disclaimer: This note was dictated by speech recognition. Minor errors in transcription may be present. Please call if questions.         [1]   Past Medical History:  Diagnosis Date    Cellulitis and abscess of mouth 08/27/2018    Abscess of soft tissue of mouth    Cutaneous abscess of face     Facial abscess    Dental caries, unspecified 03/13/2018    Pain due to dental caries    Furuncle of buttock     Boil, buttock    Opioid abuse, uncomplicated (Multi)     Narcotic abuse    Personal history of other (healed) physical injury and trauma 01/23/2020    History of back injury    Personal history of other diseases of the musculoskeletal system and connective tissue 01/23/2020    History of low back pain    Rectal abscess 10/15/2019    Perirectal abscess   [2]   Past Surgical History:  Procedure Laterality Date    OTHER SURGICAL HISTORY  10/15/2019    Tooth extraction   [3]   Social History  Tobacco Use    Smoking status: Every Day     Types: Cigarettes     Passive exposure: Never    Smokeless tobacco: Never   Vaping Use    Vaping status: Never Used   Substance Use Topics    Alcohol use: Not Currently    Drug use: Yes     Types: Marijuana   [4]   Family History  Problem Relation Name Age of Onset    Cancer Maternal Grandmother      Cancer Maternal Grandfather      Cancer Paternal Grandfather     [5]   Allergies  Allergen Reactions    Amoxicillin Rash     Tolerated Cefepime 1/11/2022    Penicillins Rash     Tolerated Cefepime 1/11/2022        Vinay Charles PA-C  06/26/25 2002

## 2025-07-10 ENCOUNTER — APPOINTMENT (OUTPATIENT)
Dept: UROLOGY | Facility: CLINIC | Age: 45
End: 2025-07-10
Payer: COMMERCIAL

## 2025-07-10 DIAGNOSIS — A63.0 CONDYLOMA ACUMINATUM OF PENIS: Primary | ICD-10-CM

## 2025-07-10 DIAGNOSIS — R39.9 URINARY SYMPTOM OR SIGN: ICD-10-CM

## 2025-07-10 LAB
POC APPEARANCE, URINE: CLEAR
POC BILIRUBIN, URINE: NEGATIVE
POC BLOOD, URINE: NEGATIVE
POC COLOR, URINE: YELLOW
POC GLUCOSE, URINE: NEGATIVE MG/DL
POC KETONES, URINE: NEGATIVE MG/DL
POC LEUKOCYTES, URINE: ABNORMAL
POC NITRITE,URINE: NEGATIVE
POC PH, URINE: 7 PH
POC PROTEIN, URINE: NEGATIVE MG/DL
POC SPECIFIC GRAVITY, URINE: 1.01
POC UROBILINOGEN, URINE: 0.2 EU/DL

## 2025-07-10 PROCEDURE — 99204 OFFICE O/P NEW MOD 45 MIN: CPT | Performed by: UROLOGY

## 2025-07-10 PROCEDURE — 81003 URINALYSIS AUTO W/O SCOPE: CPT | Performed by: UROLOGY

## 2025-07-10 NOTE — PROGRESS NOTES
07/10/2025  44-year-old gentleman presents for large penile condyloma.    Exam: Circumcised, 2 x 4 cm penile condyloma at the base    We discussed penile condyloma, surgical excision of condyloma, indication risk-benefit and alternative, possible deformity postoperatively  All the questions were answered, the patient expressed understanding and agreed to the plan.    Impression  Penile condyloma    Plan  OR excision of penile condyloma  Clearance    Chief Complaint   Patient presents with    Genital Warts     Pt is here today for genital warts. He states he has had them for a long time. He denies any voiding issues at this time.         Physical Exam     TODAYS LAB RESULTS:  ontains abnormal data POCT UA Automated manually resulted  Order: [id number]   Status: Final result       Next appt: None       Dx: Urinary symptom or sign    Test Result Released: No (inaccessible in [facility])    0 Result Notes      Component  Ref Range & Units 10:38   POC Color, Urine  Straw, Yellow, Light-Yellow Yellow   POC Appearance, Urine  Clear Clear   POC Glucose, Urine  NEGATIVE mg/dl NEGATIVE   POC Bilirubin, Urine  NEGATIVE NEGATIVE   POC Ketones, Urine  NEGATIVE mg/dl NEGATIVE   POC Specific Gravity, Urine  1.005 - 1.035 1.015   POC Blood, Urine  NEGATIVE NEGATIVE   POC PH, Urine  No Reference Range Established PH 7.0   POC Protein, Urine  NEGATIVE mg/dl NEGATIVE   POC Urobilinogen, Urine  0.2, 1.0 EU/DL 0.2   Poc Nitrite, Urine  NEGATIVE NEGATIVE   POC Leukocytes, Urine  NEGATIVE TRACE Abnormal              Specimen Collected: 07/10/25 10:38 Last Resulted: 07/10/25 10:38       ASSESSMENT&PLAN:      IMPRESSIONS:    6/15/2022 Dr. Gilbert  41-year-old very pleasant gentleman presented for multiple urological conditions. Patient has a 15 years history of untreated genital warts that has been progressively getting more in number and increasing in size. Once again today, We had a long and extensive discussion with the patient regarding  genital warts, we discussed the pathology, differential diagnosis, risk factor management. We discussed the management including excision of these multiple warts. I explained to him that given his very high number and size of his warts, he would be at increased risk of complication postop including infection, bleeding, I also instructed him that he might have recurrence and he might need another excision. I explained to the patient that around 40% of his penis that is occupied by a giant genital warts. Explained to him that excision might lead to complication including but not limited to penile curvature, erectile dysfunction, scarring of the penis, shortening of the penis, deformity of the penis. Patient verbalized understanding like to proceed with surgery. I explained to him that I will not be prescribing opioids as pain killers after the surgery.      CT was not done but patient did have US done 05/16/22 results in chart and was unremarkable. . He had US done at [facility] and they said that he didn't need to get CT.

## 2025-07-14 ENCOUNTER — HOSPITAL ENCOUNTER (OUTPATIENT)
Facility: HOSPITAL | Age: 45
Setting detail: OUTPATIENT SURGERY
End: 2025-07-14
Attending: UROLOGY | Admitting: UROLOGY
Payer: COMMERCIAL

## 2025-07-14 DIAGNOSIS — J93.9 PNEUMOTHORAX, UNSPECIFIED TYPE: ICD-10-CM

## 2025-07-14 DIAGNOSIS — A63.0 CONDYLOMA ACUMINATUM OF PENIS: ICD-10-CM

## 2025-07-14 DIAGNOSIS — R74.8 ELEVATED LIVER ENZYMES: ICD-10-CM

## 2025-07-14 DIAGNOSIS — R39.9 URINARY SYMPTOM OR SIGN: ICD-10-CM

## 2025-07-15 ENCOUNTER — TELEPHONE (OUTPATIENT)
Dept: PRIMARY CARE | Facility: CLINIC | Age: 45
End: 2025-07-15
Payer: COMMERCIAL

## 2025-07-15 NOTE — TELEPHONE ENCOUNTER
Received surgical clearance request for the patient. Surgery is 8/12/25.    No appointments with Dr. CHA available at this time.

## 2025-07-28 ENCOUNTER — TELEPHONE (OUTPATIENT)
Dept: PREADMISSION TESTING | Facility: HOSPITAL | Age: 45
End: 2025-07-28

## 2025-08-08 ENCOUNTER — ANESTHESIA EVENT (OUTPATIENT)
Dept: OPERATING ROOM | Facility: HOSPITAL | Age: 45
End: 2025-08-08

## 2025-08-08 RX ORDER — ONDANSETRON HYDROCHLORIDE 2 MG/ML
4 INJECTION, SOLUTION INTRAVENOUS ONCE AS NEEDED
OUTPATIENT
Start: 2025-08-08

## 2025-08-08 RX ORDER — HYDRALAZINE HYDROCHLORIDE 20 MG/ML
5 INJECTION INTRAMUSCULAR; INTRAVENOUS EVERY 30 MIN PRN
OUTPATIENT
Start: 2025-08-08

## 2025-08-08 RX ORDER — DROPERIDOL 2.5 MG/ML
0.62 INJECTION, SOLUTION INTRAMUSCULAR; INTRAVENOUS ONCE AS NEEDED
OUTPATIENT
Start: 2025-08-08

## 2025-08-08 RX ORDER — OXYCODONE AND ACETAMINOPHEN 5; 325 MG/1; MG/1
1 TABLET ORAL EVERY 4 HOURS PRN
Refills: 0 | OUTPATIENT
Start: 2025-08-08

## 2025-08-08 RX ORDER — FAMOTIDINE 10 MG/ML
20 INJECTION, SOLUTION INTRAVENOUS ONCE
OUTPATIENT
Start: 2025-08-08 | End: 2025-08-08

## 2025-08-08 RX ORDER — MEPERIDINE HYDROCHLORIDE 25 MG/ML
12.5 INJECTION INTRAMUSCULAR; INTRAVENOUS; SUBCUTANEOUS EVERY 10 MIN PRN
OUTPATIENT
Start: 2025-08-08

## 2025-08-08 RX ORDER — LABETALOL HYDROCHLORIDE 5 MG/ML
5 INJECTION, SOLUTION INTRAVENOUS ONCE AS NEEDED
OUTPATIENT
Start: 2025-08-08

## 2025-08-08 RX ORDER — ALBUTEROL SULFATE 0.83 MG/ML
2.5 SOLUTION RESPIRATORY (INHALATION) ONCE AS NEEDED
OUTPATIENT
Start: 2025-08-08

## 2025-08-08 RX ORDER — SODIUM CHLORIDE, SODIUM LACTATE, POTASSIUM CHLORIDE, CALCIUM CHLORIDE 600; 310; 30; 20 MG/100ML; MG/100ML; MG/100ML; MG/100ML
75 INJECTION, SOLUTION INTRAVENOUS CONTINUOUS
OUTPATIENT
Start: 2025-08-08 | End: 2025-08-08

## 2025-08-08 RX ORDER — DIPHENHYDRAMINE HYDROCHLORIDE 50 MG/ML
12.5 INJECTION, SOLUTION INTRAMUSCULAR; INTRAVENOUS ONCE AS NEEDED
OUTPATIENT
Start: 2025-08-08

## 2025-08-08 RX ORDER — MORPHINE SULFATE 2 MG/ML
2 INJECTION, SOLUTION INTRAMUSCULAR; INTRAVENOUS EVERY 5 MIN PRN
OUTPATIENT
Start: 2025-08-08

## 2025-08-08 RX ORDER — LIDOCAINE HYDROCHLORIDE 10 MG/ML
0.1 INJECTION, SOLUTION EPIDURAL; INFILTRATION; INTRACAUDAL; PERINEURAL ONCE
OUTPATIENT
Start: 2025-08-08 | End: 2025-08-08

## 2025-08-08 RX ORDER — SODIUM CHLORIDE, SODIUM LACTATE, POTASSIUM CHLORIDE, CALCIUM CHLORIDE 600; 310; 30; 20 MG/100ML; MG/100ML; MG/100ML; MG/100ML
100 INJECTION, SOLUTION INTRAVENOUS CONTINUOUS
OUTPATIENT
Start: 2025-08-08 | End: 2025-08-08

## 2025-08-11 ENCOUNTER — TELEPHONE (OUTPATIENT)
Dept: UROLOGY | Facility: CLINIC | Age: 45
End: 2025-08-11
Payer: COMMERCIAL

## 2025-08-12 ENCOUNTER — ANESTHESIA (OUTPATIENT)
Dept: OPERATING ROOM | Facility: HOSPITAL | Age: 45
End: 2025-08-12

## (undated) DEVICE — GUIDEWIRE ORTH L150MM DIA1.25MM S STL NTHRD FOR 4MM CANN

## (undated) DEVICE — BANDAGE COMPR W4INXL10YD WHITE/BEIGE E MTRX HK LOOP CLSR

## (undated) DEVICE — GLOVE ORTHO 8   MSG9480

## (undated) DEVICE — ZIMMER® STERILE DISPOSABLE TOURNIQUET CUFF WITH PROTECTIVE SLEEVE AND PLC, DUAL PORT, SINGLE BLADDER, 30 IN. (76 CM)

## (undated) DEVICE — GLOVE ORANGE PI 8   MSG9080

## (undated) DEVICE — TUBING SUCT 12FR MAL ALUM SHFT FN CAP VENT UNIV CONN W/ OBT

## (undated) DEVICE — LOWER EXT KNEE DRAPE: Brand: MEDLINE INDUSTRIES, INC.

## (undated) DEVICE — PADDING,UNDERCAST,COTTON, 4"X4YD STERILE: Brand: MEDLINE

## (undated) DEVICE — KIT ARMOR C DRP COLLAPSIBLE AND SELF EXP TOP CVR FOR FLUOROSCOPIC

## (undated) DEVICE — BIT DRL L160MM DIA2.7MM ST CANN QUIK CPL NONRADIOLUCENT ADJ

## (undated) DEVICE — DRESSING,GAUZE,XEROFORM,CURAD,5"X9",ST: Brand: CURAD